# Patient Record
Sex: FEMALE | Race: WHITE | NOT HISPANIC OR LATINO | Employment: FULL TIME | URBAN - METROPOLITAN AREA
[De-identification: names, ages, dates, MRNs, and addresses within clinical notes are randomized per-mention and may not be internally consistent; named-entity substitution may affect disease eponyms.]

---

## 2022-02-22 ENCOUNTER — TELEPHONE (OUTPATIENT)
Dept: NEUROLOGY | Facility: CLINIC | Age: 22
End: 2022-02-22

## 2022-02-22 NOTE — TELEPHONE ENCOUNTER
Called and spoke w/the patient about an appointment time change  She advised she is ok w/the change  The date stayed the same   Appointment time is now at 8:15am     3/2/2022 Status: Edinson    Time: 8:15 AM Length: 60   Visit Type: CONSULT PG [[id number]]     Provider: Kathie Lindsey

## 2022-03-02 ENCOUNTER — CONSULT (OUTPATIENT)
Dept: NEUROLOGY | Facility: CLINIC | Age: 22
End: 2022-03-02
Payer: COMMERCIAL

## 2022-03-02 VITALS
TEMPERATURE: 97.5 F | SYSTOLIC BLOOD PRESSURE: 128 MMHG | BODY MASS INDEX: 31.02 KG/M2 | HEART RATE: 87 BPM | DIASTOLIC BLOOD PRESSURE: 69 MMHG | HEIGHT: 64 IN | WEIGHT: 181.7 LBS

## 2022-03-02 DIAGNOSIS — G43.829 MENSTRUAL MIGRAINE WITHOUT STATUS MIGRAINOSUS, NOT INTRACTABLE: ICD-10-CM

## 2022-03-02 DIAGNOSIS — G44.229 CHRONIC TENSION-TYPE HEADACHE, NOT INTRACTABLE: Primary | ICD-10-CM

## 2022-03-02 DIAGNOSIS — E55.9 VITAMIN D DEFICIENCY: ICD-10-CM

## 2022-03-02 PROCEDURE — 99204 OFFICE O/P NEW MOD 45 MIN: CPT

## 2022-03-02 RX ORDER — LEVONORGESTREL AND ETHINYL ESTRADIOL 0.1-0.02MG
KIT ORAL
COMMUNITY
Start: 2021-12-27 | End: 2022-07-25

## 2022-03-02 RX ORDER — DIPHENHYDRAMINE HCL 25 MG
25 TABLET ORAL EVERY 6 HOURS PRN
COMMUNITY

## 2022-03-02 RX ORDER — NARATRIPTAN 2.5 MG/1
TABLET ORAL
Qty: 9 TABLET | Refills: 2 | Status: SHIPPED | OUTPATIENT
Start: 2022-03-02 | End: 2022-07-25

## 2022-03-02 RX ORDER — AMITRIPTYLINE HYDROCHLORIDE 10 MG/1
TABLET, FILM COATED ORAL
Qty: 30 TABLET | Refills: 2 | Status: SHIPPED | OUTPATIENT
Start: 2022-03-02 | End: 2022-03-24

## 2022-03-02 NOTE — PATIENT INSTRUCTIONS
-  Complete lab work  -  Obtain MRI of the brain  - start amitriptyline 10 mg at bedtime take this every day,   Call me in 8 weeks to consider increasing if necessary  - take naratriptan as needed at the onset of a migraine  -  magnesium 500 mg daily, could consider taking twice daily however may cause loose stools  - riboflavin 400 mg daily (may cause bright yellow urine)  -  Please track your headaches and migraines using a free josé antonio on your phone  - Follow up in 3 months

## 2022-03-02 NOTE — ASSESSMENT & PLAN NOTE
Type 2 "Full blown" migraine  She states starts similar to type 1 headache but then builds gradually  Described as pressure behind the left eye, needle type pain  Can be on the right at times behind eyes as well  Pain can radiate into forehead at times  10/10 intensity  Associated with full body aches and light sensitivity  No auara  Lasts until she can go to sleep, maximum of 12 hours  Not positional, never wakes her up out of her sleep  Occurs 1-2 times a month usually with her menses  No other identified triggers    She has never been on preventative treatment for her migraines or chronic headaches as she was told they were not frequent enough  Her last neurologist prescribed ubrelvy as an abortive however this has not been effective  Her exam was normal today  We will initiate treatment for her chronic episodic tension type headaches with amitriptyline  We discussed that this may also be beneficial for her menstrual migraines  However, another option discussed was continuous cycling of her oral birth control, eliminating the placebo pills from her pack  I explained that this would have to be discussed with her ob/gyn and they could direct her how to safely do this and that this may prevent abrupt hormone fluctuations that can be associated with menstrual migraines  We also agreed that since her Inna Glaze is not effective, we will discontinue this and initiate an alternative  She has never tried a Triptan in the past, I will prescribe naratriptan 2 5 mg for her to take at the onset of a migraine  We discussed rebound headaches/migraines and educated her that she should not use more than 3 per week or 9 per month  We discussed that her exam is normal today and not indicative of a intracranial abnormality however since she has had an increase in the frequency of her headaches, we will proceed with a MRI of the brain to be thorough   I will also order labs that have not previously been ordered (CRP, sed, TSH) and Vitamin D as she has been chronically deficient and is not currently taking a supplement/replacement

## 2022-03-02 NOTE — PROGRESS NOTES
Patient ID: Opal Loyd is a 25 y o  female  Assessment/Plan:    Chronic tension-type headache, not intractable  Meri Suh is a 25year old female who presents to the office as a consultation for headaches  She states she began having headaches at age 21years old  She was previously under the care of a neurologist however, she moved and would like to establish care with a new practice closer to her home  Unfortunately, there are no records available to review today  We discussed that her daily headaches seem to be consistent with chronic tension type headaches  Due to the daily frequency at this time I do recommend treatment with a prophylactic medication  We discussed several options including topiramate, gabapentin and amitriptyline  After reviewing potential benefits and adverse effects, she would prefer to start amitriptyline  She admits to underlying anxiety, so this may also be helpful in this aspect as well  She will start with 10 mg at bedtime and will call me in 8 weeks to determine efficiency and if needed can increase to 20 mg  Other recommendations provided today included:  -  Complete lab work  -  Obtain MRI of the brain  - start amitriptyline 10 mg at bedtime take this every day,   Call me in 8 weeks to consider increasing if necessary  - take naratriptan as needed at the onset of a migraine  -  magnesium 500 mg daily, could consider taking twice daily however may cause loose stools  - riboflavin 400 mg daily (may cause bright yellow urine)  - Stay well hydrated  - Do not skip meals  -  Please track your headaches and migraines using a free josé antonio on your phone  - Follow up in 3 months    Menstrual migraine without status migrainosus, not intractable  Type 2 "Full blown" migraine  She states starts similar to type 1 headache but then builds gradually  Described as pressure behind the left eye, needle type pain  Can be on the right at times behind eyes as well    Pain can radiate into forehead at times  10/10 intensity  Associated with full body aches and light sensitivity  No auara  Lasts until she can go to sleep, maximum of 12 hours  Not positional, never wakes her up out of her sleep  Occurs 1-2 times a month usually with her menses  No other identified triggers    She has never been on preventative treatment for her migraines or chronic headaches as she was told they were not frequent enough  Her last neurologist prescribed ubrelvy as an abortive however this has not been effective  Her exam was normal today  We will initiate treatment for her chronic episodic tension type headaches with amitriptyline  We discussed that this may also be beneficial for her menstrual migraines  However, another option discussed was continuous cycling of her oral birth control, eliminating the placebo pills from her pack  I explained that this would have to be discussed with her ob/gyn and they could direct her how to safely do this and that this may prevent abrupt hormone fluctuations that can be associated with menstrual migraines  We also agreed that since her Lauren Labella is not effective, we will discontinue this and initiate an alternative  She has never tried a Triptan in the past, I will prescribe naratriptan 2 5 mg for her to take at the onset of a migraine  We discussed rebound headaches/migraines and educated her that she should not use more than 3 per week or 9 per month  We discussed that her exam is normal today and not indicative of a intracranial abnormality however since she has had an increase in the frequency of her headaches, we will proceed with a MRI of the brain to be thorough  I will also order labs that have not previously been ordered (CRP, sed, TSH) and Vitamin D as she has been chronically deficient and is not currently taking a supplement/replacement         Diagnoses and all orders for this visit:    Chronic tension-type headache, not intractable  -     TSH, 3rd generation with Free T4 reflex; Future  -     MRI brain without contrast; Future  -     amitriptyline (ELAVIL) 10 mg tablet; Take 1 tablet at bedtime  -     C-reactive protein; Future  -     Sedimentation rate, automated; Future    Menstrual migraine without status migrainosus, not intractable  -     naratriptan (AMERGE) 2 5 MG tablet; 1 tablet at the onset of migraine, may repeat once in 4 hours if needed  -     C-reactive protein; Future  -     Sedimentation rate, automated; Future    Vitamin D deficiency  -     Vitamin D 1,25 dihydroxy; Future        Subjective:    HPI Haven Sparks is a 25year old female who presents to the office as a consultation for headaches  She states she began having headaches at age 21years old  She was previously under the care of a neurologist however, she moved and would like to establish care with a new practice closer to her home  Unfortunately, there are no records available to review today  She works as a  and has a 1 hour commute there and back  She works 11 hour shifts, 4 days on and 4 days off and does not currently have a PCP  She describes two distinct types of headaches:      Type 1 Headache  described as "nagging"  Currently occuring everyday  She states it is "band like" and can be squeezing/tight  Average pain 3/10  gradual onset  Lasts 12 hours  No identified triggers    Type 2 "Full blown" migraine  She states starts similar to type 1 headache but then builds gradually  Described as pressure behind the left eye, needle type pain  Can be on the right at times behind eyes as well  Pain can radiate into forehead at times  10/10 intensity  Associated with full body aches and light sensitivity  No auara  Lasts until she can go to sleep, maximum of 12 hours  Not positional, never wakes her up out of her sleep    Occurs 1-2 times a month usually with her menses  No other identified triggers    She has never been on preventative treatment for her migraines or chronic headaches as she was told they were not frequent enough  Her last neurologist prescribed ubrelvy as an abortive however this has not been effective  Temporal Pattern of Headaches:  Started having HA's 21 y o  Worst time of day: during the day if in the sun  Awaken from sleep?: no  Seasonal pattern?: yes - worse in the summer  'Clustering' of HA's over time? no  Overall pattern since problem began: gradually worsening migraines are worse    Degree of Functional Impairment: moderate when she has a migraine cannot get out of bed, headaches do not impair her at all  Current Use of Meds to Treat HA:  Abortive meds? ubrelvy and advil  Daily use? no  Preventive meds? {none  Non-Medical/Alternative treatments for HA: peppermint oil    Additional Relevant History:  History of head/neck trauma? no  History of head/neck surgery? No- tonsils and adenoids  Family h/o headache problems? yes - mom, maternal grandmother with brain tumor  Family history of aneurysms? no  Exposure to carbon monoxide? no  Substance use: alcohol: socially, tobacco: vape daily, caffeine: coffee 2x week    Prior Evaluation/Treatments:  Have you seen another physician for your headaches? yes "Dr Ольга Garrido"  Prior work-up: blood work was tested for Lyme, RA, MOUNA,- all negative Vit D- defficient  Trigger point injections? no  Botox injections? no  Epidural injections? no  Prior PREVENTATIVE medications: none  Prior ABORTIVE mediations: acetaminophen, aspirin/acetaminophen/caffeine      The following portions of the patient's history were reviewed and updated as appropriate: allergies, current medications, past family history, past medical history, past social history and past surgical history  Objective:    Blood pressure 128/69, pulse 87, temperature 97 5 °F (36 4 °C), temperature source Skin, height 5' 4" (1 626 m), weight 82 4 kg (181 lb 11 2 oz)  Neurological Exam    On neurological examination patient is alert, awake, oriented and in no distress   Speech is fluent without dysarthria or aphasia  Cranial nerves 2-12 were symmetrically intact bilaterally  Visual acuity was 20/20 on hand held eye chart  No temporal artery tenderness  No evidence of any focal weakness or sensory loss in the upper or lower extremities  Motor testing reveals 5/5 strength of the bilateral upper and lower extremities  There was no pronator drift  No fasciculations present  No abnormal involuntary movements  Finger- to-nose reveals no tremor or ataxia and intact proprioceptive function, no dysmetria was noted  Sensation was intact to vibration, light touch, pin prick and temperature in bilateral upper and lower extremities  Deep tendon reflexes were 2+ and symmetric in the bilateral upper and lower extremities  She is able to rise easily without assistance from a seated position  Casual gait is normal including stance, stride, and arm swing  Normal tandem gait  Romberg is absent  There is no tenderness on palpation of the cervical, thoracic or lumbar spinous process, with no radicular pain noted  ROS:    Review of Systems   Constitutional: Negative  Negative for appetite change and fever  HENT: Positive for tinnitus  Negative for hearing loss, trouble swallowing and voice change  Eyes: Positive for photophobia  Negative for pain  Respiratory: Negative  Negative for shortness of breath  Cardiovascular: Negative  Negative for palpitations  Gastrointestinal: Negative  Negative for nausea and vomiting  Endocrine: Negative  Negative for cold intolerance  Genitourinary: Negative  Negative for dysuria, frequency and urgency  Musculoskeletal: Negative  Negative for myalgias and neck pain  Skin: Negative  Negative for rash  Neurological: Positive for headaches (2-3 week headaches and 1 migraine per month around menses time)  Negative for dizziness, tremors, seizures, syncope, facial asymmetry, speech difficulty, weakness, light-headedness and numbness  Hematological: Negative  Does not bruise/bleed easily  Psychiatric/Behavioral: Negative  Negative for confusion, hallucinations and sleep disturbance  Reviewed ROS as entered by medical assistant

## 2022-03-02 NOTE — ASSESSMENT & PLAN NOTE
Dick Mills is a 25year old female who presents to the office as a consultation for headaches  She states she began having headaches at age 21years old  She was previously under the care of a neurologist however, she moved and would like to establish care with a new practice closer to her home  Unfortunately, there are no records available to review today  We discussed that her daily headaches seem to be consistent with chronic tension type headaches  Due to the daily frequency at this time I do recommend treatment with a prophylactic medication  We discussed several options including topiramate, gabapentin and amitriptyline  After reviewing potential benefits and adverse effects, she would prefer to start amitriptyline  She admits to underlying anxiety, so this may also be helpful in this aspect as well  She will start with 10 mg at bedtime and will call me in 8 weeks to determine efficiency and if needed can increase to 20 mg      Other recommendations provided today included:  -  Complete lab work  -  Obtain MRI of the brain  - start amitriptyline 10 mg at bedtime take this every day,   Call me in 8 weeks to consider increasing if necessary  - take naratriptan as needed at the onset of a migraine  -  magnesium 500 mg daily, could consider taking twice daily however may cause loose stools  - riboflavin 400 mg daily (may cause bright yellow urine)  - Stay well hydrated  - Do not skip meals  -  Please track your headaches and migraines using a free josé antonio on your phone  - Follow up in 3 months

## 2022-03-04 LAB
1,25(OH)2D3 SERPL-MCNC: 47.4 PG/ML (ref 19.9–79.3)
25(OH)D3+25(OH)D2 SERPL-MCNC: 24.5 NG/ML (ref 30–100)
CRP SERPL-MCNC: 7 MG/L (ref 0–10)
ERYTHROCYTE [SEDIMENTATION RATE] IN BLOOD BY WESTERGREN METHOD: 16 MM/HR (ref 0–32)
TSH SERPL DL<=0.005 MIU/L-ACNC: 2.41 UIU/ML (ref 0.45–4.5)

## 2022-03-07 DIAGNOSIS — E55.9 MILD VITAMIN D DEFICIENCY: Primary | ICD-10-CM

## 2022-03-07 RX ORDER — ERGOCALCIFEROL 1.25 MG/1
50000 CAPSULE ORAL WEEKLY
Qty: 6 CAPSULE | Refills: 0 | Status: SHIPPED | OUTPATIENT
Start: 2022-03-07 | End: 2022-04-25 | Stop reason: ALTCHOICE

## 2022-04-24 DIAGNOSIS — E55.9 MILD VITAMIN D DEFICIENCY: ICD-10-CM

## 2022-04-25 RX ORDER — ERGOCALCIFEROL 1.25 MG/1
50000 CAPSULE ORAL WEEKLY
Qty: 6 CAPSULE | Refills: 0 | OUTPATIENT
Start: 2022-04-25

## 2022-06-10 ENCOUNTER — TELEPHONE (OUTPATIENT)
Dept: NEUROLOGY | Facility: CLINIC | Age: 22
End: 2022-06-10

## 2022-06-10 NOTE — TELEPHONE ENCOUNTER
Called patient and lvm to call back to confirm [facility]la [facility] and also to see if she has done the Mri that it was order on her last office with us

## 2022-06-24 DIAGNOSIS — G44.229 CHRONIC TENSION-TYPE HEADACHE, NOT INTRACTABLE: ICD-10-CM

## 2022-06-24 RX ORDER — AMITRIPTYLINE HYDROCHLORIDE 10 MG/1
TABLET, FILM COATED ORAL
Qty: 90 TABLET | Refills: 0 | Status: SHIPPED | OUTPATIENT
Start: 2022-06-24 | End: 2022-07-25

## 2022-06-27 ENCOUNTER — TELEPHONE (OUTPATIENT)
Dept: NEUROLOGY | Facility: CLINIC | Age: 22
End: 2022-06-27

## 2022-06-27 ENCOUNTER — TELEPHONE (OUTPATIENT)
Dept: FAMILY MEDICINE CLINIC | Facility: CLINIC | Age: 22
End: 2022-06-27

## 2022-06-27 NOTE — TELEPHONE ENCOUNTER
Pt calling and states that Dr Leola Toledo told her to call if pregnant  Pregnancy test came back positive recently  Pt states she is in early stage and has not had visit with OB yet  Pt asking if there are any medications she should stop taking  Pt currently taking amitriptyline 10 mg takes 1 tab at HS, also taking naratriptan 2 5 mg 1 tablet PRN  Pt states she takes OTC riboflavin, magnesium and D3  Pt also asking if it's ok that she is taking 2 aleve every night for back spasms  Dr Leola Toledo - Please advise  Best [phone], ok to leave detailed message

## 2022-06-27 NOTE — TELEPHONE ENCOUNTER
LMOM patient scheduled an appointment through [facility] for 6/28  Patient is not an established pt with [facility]  Patient needs to reschedule appt on 6/28 for establish care      Jimmy Fermin

## 2022-06-28 NOTE — TELEPHONE ENCOUNTER
Hi, I saw Tracie Aparicio on 3/2/22  Please let her know she should discontinue Amitriptyline if she is currently pregnant  She is only taking 10 mg at bedtime, so no need to taper- she can just stop  Naratriptan is considered "low risk in pregnancy" however I would prefer she use tylenol (which is safer) as needed for her headaches and her back spasms   She can continue with riboflavin, magnesium and Vitamin D

## 2022-06-28 NOTE — TELEPHONE ENCOUNTER
Pt made aware of Rand's recommendations  Verbalizes clear understanding  Pt asking about the Tylenol  Pt informs that in the past her liver enzymes were increased  She tested negative for hepatitis and was told it was from taking Tylenol  Pt asking if it is ok that she takes Tylenol even with this in her history  Pt says she was taking Tylenol for back spasms  Rand - Please advise

## 2022-07-25 ENCOUNTER — OFFICE VISIT (OUTPATIENT)
Dept: OBGYN CLINIC | Facility: CLINIC | Age: 22
End: 2022-07-25
Payer: COMMERCIAL

## 2022-07-25 VITALS
WEIGHT: 192.8 LBS | BODY MASS INDEX: 32.91 KG/M2 | HEIGHT: 64 IN | DIASTOLIC BLOOD PRESSURE: 80 MMHG | SYSTOLIC BLOOD PRESSURE: 124 MMHG

## 2022-07-25 DIAGNOSIS — N91.2 AMENORRHEA: Primary | ICD-10-CM

## 2022-07-25 LAB — SL AMB POCT URINE HCG: POSITIVE

## 2022-07-25 PROCEDURE — 99204 OFFICE O/P NEW MOD 45 MIN: CPT | Performed by: PHYSICIAN ASSISTANT

## 2022-07-25 PROCEDURE — 81025 URINE PREGNANCY TEST: CPT | Performed by: PHYSICIAN ASSISTANT

## 2022-07-25 PROCEDURE — 76801 OB US < 14 WKS SINGLE FETUS: CPT | Performed by: PHYSICIAN ASSISTANT

## 2022-07-25 RX ORDER — CHOLECALCIFEROL (VITAMIN D3) 1250 MCG
CAPSULE ORAL
COMMUNITY

## 2022-07-25 NOTE — PROGRESS NOTES
Assessment/Plan:  - Viable IUP @ 7w6d today   - BRUNO 2023 (adjusted based on US)  - Continue PNV  - Call for concerns  - RTO 2 weeks for OB intake     Diagnoses and all orders for this visit:    Amenorrhea  -     POCT urine HCG  -     AMB US OB < 14 weeks single or first gestation level 1  -     Ambulatory Referral to Maternal Fetal Medicine; Future    Other orders  -     Cholecalciferol (Vitamin D3) 1 25 MG (64430 UT) CAPS  -     Magnesium 400 MG CAPS  -     Prenatal Multivit-Min-Fe-FA (Pre-Pretty Formula) TABS  -     Acetaminophen (TYLENOL PO); Take by mouth          Subjective:      Patient ID: Jeremy Link is a 25 y o  female  Angel Luis Yang is a 21YO  WF presenting to the office for pregnancy confirmation US  She reports her LMP as 22, placing her at [address] today  She has had some nausea, but only one vomiting episode  She is feeling nervous today  She reports her mother had difficulties staying pregnant  The following portions of the patient's history were reviewed and updated as appropriate: allergies, current medications, past family history, past medical history, past social history, past surgical history and problem list     Review of Systems      Objective:      /80 (BP Location: Right arm, Patient Position: Sitting, Cuff Size: Standard)   Ht 5' 4" (1 626 m)   Wt 87 5 kg (192 lb 12 8 oz)   LMP 2022   BMI 33 09 kg/m²          Physical Exam  Vitals reviewed  Constitutional:       Appearance: Normal appearance  She is normal weight  HENT:      Head: Normocephalic and atraumatic  Pulmonary:      Effort: Pulmonary effort is normal    Genitourinary:     General: Normal vulva  Labia:         Right: No rash or lesion  Left: No rash or lesion  Comments: TVUS reveals IUP, yolk sac, fetal pole, +CM  CRL 1 51 cm (7w6d)   bpm  BRUNO 2023  Skin:     General: Skin is warm and dry  Neurological:      General: No focal deficit present        Mental Status: She is alert  Psychiatric:         Mood and Affect: Mood normal          Behavior: Behavior normal            Ultrasound Probe Disinfection    A transvaginal ultrasound was performed     Prior to use, disinfection was performed with High Level Disinfection Process (Trophon)  Probe serial number [id number]: [id number] was used    Flynn Klinefelter, PA-C  07/25/22  3:31 PM

## 2022-08-16 ENCOUNTER — INITIAL PRENATAL (OUTPATIENT)
Dept: OBGYN CLINIC | Facility: CLINIC | Age: 22
End: 2022-08-16

## 2022-08-16 VITALS — BODY MASS INDEX: 33.4 KG/M2 | WEIGHT: 194.6 LBS

## 2022-08-16 DIAGNOSIS — O21.9 NAUSEA AND VOMITING IN PREGNANCY: ICD-10-CM

## 2022-08-16 DIAGNOSIS — O99.891 BACK PAIN AFFECTING PREGNANCY IN FIRST TRIMESTER: ICD-10-CM

## 2022-08-16 DIAGNOSIS — O99.211 OBESITY AFFECTING PREGNANCY IN FIRST TRIMESTER: Primary | ICD-10-CM

## 2022-08-16 DIAGNOSIS — M54.9 BACK PAIN AFFECTING PREGNANCY IN FIRST TRIMESTER: ICD-10-CM

## 2022-08-16 PROCEDURE — OBC

## 2022-08-16 RX ORDER — ONDANSETRON 8 MG/1
8 TABLET, ORALLY DISINTEGRATING ORAL EVERY 8 HOURS PRN
Qty: 20 TABLET | Refills: 0 | Status: SHIPPED | OUTPATIENT
Start: 2022-08-16 | End: 2022-10-13 | Stop reason: SDUPTHER

## 2022-08-16 NOTE — PROGRESS NOTES
OB INTAKE INTERVIEW  Pt presents for OB intake  The patient was oriented to our practice and all questions were answered  PT referral place d/t back pain  Pt  Requests script for zofran  Plan:  - Prenatal labs ordered  - Referral given for MFM at confirmation scan  - Reviewed Genetic testing options   CF:   SMA:   - Patient to call for concerns  - RTO 3-4 weeks for OB F/U visit and PAP/Cultures    ~Last pap: pt  Has never had pap  OB History        1    Para        Term                AB        Living           SAB        IAB        Ectopic        Multiple        Live Births                         Last Menstrual Period:      22             Ultrasound date: 22  Estimated Date of Delivery:  3/7/23     Current Issues:  Constipation :     yes, discussed increased hydration use of stool softener and probiotic  Pt  Does take fiber supplement  Headaches :               Yes--pt  Has hx of migraines  Discussed use of tylenol PRN and use of naratriptan for rescue sparingly  Pt  Taking magnesium and riboflavin daily for prevention  Cramping:              declines  Spotting :                declines     Interview education  · [facility] P[facility] Essentials reviewed and discussed   · Baby and [address] Handout  · [facility] MFM Handouts  · Discussed genetic testing  · Prenatal lab work: Scripts printed and given to pt         Prior Pregnancy Delivery Complications---n/a              History of  delivery or PPROM:   History of Shoulder Dystocia:               History of vacuum or forceps delivery:               History of 3rd/4th degree laceration:               History of  section:      Diabetes              Pregestational DM: no              hx of GDM: no              BMI >35: no              first degree relative with type 2 diabetes: no              hx of PCOS: no              current metformin use: no              prior hx of LGA/macrosomia: no Hypertension              Hx of chronic HTN: no              hx of gestational HTN: no               hx of preeclampsia, eclampsia, or HELLP syndrome: no              Age 28 or older: no              Multifetal gestation: no  Type 1 or Type 2 DM: no  Renal Disease: no  Autoimmune disease: no   Nulliparity: YES  Obesity (BMI over 30): YES  More than 10 year pregnancy interval: no  Previous IUGR, low birthweight or small for gestational age: no     Immunizations:                influenza vaccine: Pt  agreeable              Covid Vaccination: Up to date with 1 booster  Discussed Tdap vaccine administration at 27-28 weeks                   There is no immunization history on file for this patient           Dental visit with last 6 months: unable to assess  PHQ-2/9 score: unable to assess  MyChart activated (not [age] years of age)?: Zack Rosa

## 2022-08-18 ENCOUNTER — APPOINTMENT (OUTPATIENT)
Dept: LAB | Facility: HOSPITAL | Age: 22
End: 2022-08-18
Payer: COMMERCIAL

## 2022-08-18 DIAGNOSIS — O99.211 OBESITY AFFECTING PREGNANCY IN FIRST TRIMESTER: ICD-10-CM

## 2022-08-18 DIAGNOSIS — G44.229 CHRONIC TENSION-TYPE HEADACHE, NOT INTRACTABLE: ICD-10-CM

## 2022-08-18 DIAGNOSIS — G43.829 MENSTRUAL MIGRAINE WITHOUT STATUS MIGRAINOSUS, NOT INTRACTABLE: ICD-10-CM

## 2022-08-18 DIAGNOSIS — E55.9 VITAMIN D DEFICIENCY: ICD-10-CM

## 2022-08-18 LAB
ABO GROUP BLD: NORMAL
BASOPHILS # BLD AUTO: 0.03 THOUSANDS/ΜL (ref 0–0.1)
BASOPHILS NFR BLD AUTO: 0 % (ref 0–1)
BLD GP AB SCN SERPL QL: NEGATIVE
EOSINOPHIL # BLD AUTO: 0.32 THOUSAND/ΜL (ref 0–0.61)
EOSINOPHIL NFR BLD AUTO: 3 % (ref 0–6)
ERYTHROCYTE [DISTWIDTH] IN BLOOD BY AUTOMATED COUNT: 12.8 % (ref 11.6–15.1)
HBV SURFACE AG SER QL: NORMAL
HCT VFR BLD AUTO: 38.9 % (ref 34.8–46.1)
HCV AB SER QL: NORMAL
HGB BLD-MCNC: 13.2 G/DL (ref 11.5–15.4)
IMM GRANULOCYTES # BLD AUTO: 0.04 THOUSAND/UL (ref 0–0.2)
IMM GRANULOCYTES NFR BLD AUTO: 0 % (ref 0–2)
LYMPHOCYTES # BLD AUTO: 2.23 THOUSANDS/ΜL (ref 0.6–4.47)
LYMPHOCYTES NFR BLD AUTO: 23 % (ref 14–44)
MCH RBC QN AUTO: 28.5 PG (ref 26.8–34.3)
MCHC RBC AUTO-ENTMCNC: 33.9 G/DL (ref 31.4–37.4)
MCV RBC AUTO: 84 FL (ref 82–98)
MONOCYTES # BLD AUTO: 0.69 THOUSAND/ΜL (ref 0.17–1.22)
MONOCYTES NFR BLD AUTO: 7 % (ref 4–12)
NEUTROPHILS # BLD AUTO: 6.51 THOUSANDS/ΜL (ref 1.85–7.62)
NEUTS SEG NFR BLD AUTO: 67 % (ref 43–75)
NRBC BLD AUTO-RTO: 0 /100 WBCS
PLATELET # BLD AUTO: 309 THOUSANDS/UL (ref 149–390)
PMV BLD AUTO: 8.9 FL (ref 8.9–12.7)
RBC # BLD AUTO: 4.63 MILLION/UL (ref 3.81–5.12)
RH BLD: POSITIVE
RUBV IGG SERPL IA-ACNC: 46 IU/ML
SPECIMEN EXPIRATION DATE: NORMAL
WBC # BLD AUTO: 9.82 THOUSAND/UL (ref 4.31–10.16)

## 2022-08-18 PROCEDURE — 86787 VARICELLA-ZOSTER ANTIBODY: CPT

## 2022-08-18 PROCEDURE — 36415 COLL VENOUS BLD VENIPUNCTURE: CPT

## 2022-08-18 PROCEDURE — 80081 OBSTETRIC PANEL INC HIV TSTG: CPT

## 2022-08-18 PROCEDURE — 86803 HEPATITIS C AB TEST: CPT

## 2022-08-18 PROCEDURE — 87086 URINE CULTURE/COLONY COUNT: CPT

## 2022-08-19 LAB
HIV 1+2 AB+HIV1 P24 AG SERPL QL IA: NORMAL
RPR SER QL: NORMAL
VZV IGG SER QL IA: ABNORMAL

## 2022-08-20 LAB
BACTERIA UR CULT: ABNORMAL
BACTERIA UR CULT: ABNORMAL

## 2022-08-23 ENCOUNTER — TELEPHONE (OUTPATIENT)
Dept: OBGYN CLINIC | Facility: CLINIC | Age: 22
End: 2022-08-23

## 2022-08-23 ENCOUNTER — EVALUATION (OUTPATIENT)
Dept: PHYSICAL THERAPY | Facility: CLINIC | Age: 22
End: 2022-08-23
Payer: COMMERCIAL

## 2022-08-23 DIAGNOSIS — M54.9 BACK PAIN AFFECTING PREGNANCY IN FIRST TRIMESTER: Primary | ICD-10-CM

## 2022-08-23 DIAGNOSIS — O99.891 BACK PAIN AFFECTING PREGNANCY IN FIRST TRIMESTER: Primary | ICD-10-CM

## 2022-08-23 PROCEDURE — 97112 NEUROMUSCULAR REEDUCATION: CPT

## 2022-08-23 NOTE — TELEPHONE ENCOUNTER
Patient called  She is 12 weeks pregnant and would like to know if she can get a prenatal massage at [facility]  Notified patient that this is ok

## 2022-08-23 NOTE — PROGRESS NOTES
PT Evaluation     Today's date: 2022  Patient name: Manuela Fraser  : 2000  MRN: [id number]  Referring provider: Tamica Drake MD  Dx:   Encounter Diagnosis     ICD-10-CM    1  Back pain affecting pregnancy in first trimester  O99 891 Ambulatory Referral to Physical Therapy    M54 9                   Assessment  Assessment details: Manuela Fraser is a 25 y o  female who presents with pain, decreased strength, decreased ROM, decreased joint mobility and postural dysfunction  Due to these impairments, patient has difficulty performing ADL's, recreational activities, lifting/carrying, transfers, reaching  Patient's clinical presentation is consistent with their referring diagnosis of Back pain affecting pregnancy in first trimester  (primary encounter diagnosis)  Plan: Ambulatory Referral to Physical Therapy    Patient has been educated in home exercise program and plan of care   Patient would benefit from skilled physical therapy services to address their aforementioned functional limitations and progress towards prior level of function and independence with home exercise program      Impairments: abnormal gait, abnormal or restricted ROM, activity intolerance, impaired physical strength, lacks appropriate home exercise program, pain with function, poor posture  and poor body mechanics    Goals  Short term goals to be accomplished in 3 weeks:  STG 1: Pt will demo independence with postural management  STG 2: Pt will demo I with HEP to maximize progress between therapy sessions  STG 3: Pt will demo L/S AROM < or = min loss throughout to promote improved functional mobility and body mechanics  STG 4: Pt will demo 1/2 MMT grade core stabilizers to improve lumbar stability with functional challenges  STG 5: Pt will reports pain dec freq and intensity 50%  STG 6: Pt will deny sleep disturbance    Long term goals to be accomplished in 6 weeks:  LTG 1: Pt will demo good body mech with >75% functional challenges to prevent reinjury  LTG 2: Pt will be able to return to walking activity for 30 minutes/hours pain free as per PLOF  LTG 3: Pt will demo Good strength core stabilizers to promote carryover with body mech and posture    Plan  Plan details:  HEP development, stretching, strengthening, A/AA/PROM, joint mobilizations, posture education, STM/MI as needed to reduce muscle tension, muscle reeducation, patient has been educated in Dx, prognosis and plan of care and is in agreement    Patient would benefit from: PT eval and skilled physical therapy  Planned modality interventions: cryotherapy and thermotherapy: hydrocollator packs  Planned therapy interventions: manual therapy, neuromuscular re-education, self care, therapeutic activities, therapeutic exercise and home exercise program  Frequency: 2x week  Duration in weeks: 6  Treatment plan discussed with: patient        Subjective Evaluation    History of Present Illness  Mechanism of injury: Pt reports that she has had pain in her low back for quite some time  States that due to her recent pregnancy she has had an increase in symptoms  She has pain when walking for long periods of time,sitting for a while, going from a sit to stand, and doing vigorous activity  States that at times she has numbness and tingling down her right leg  She reports that she does have a very sedentary lifestyle due to lack of time and busy schedule     Pain  Current pain ratin  At best pain ratin  At worst pain ratin  Quality: sharp, knife-like, pulling, tight and discomfort  Relieving factors: rest, change in position, heat and ice  Aggravating factors: walking, stair climbing, sitting, running and lifting  Progression: no change    Social Support  Steps to enter house: yes  Stairs in house: yes   Lives with: spouse and significant other    Employment status: working  Treatments  Previous treatment: physical therapy  Current treatment: physical therapy  Patient Goals  Patient goals for therapy: increased strength, independence with ADLs/IADLs, return to work, return to sport/leisure activities and decreased pain          Objective     Postural Observations  Seated posture: poor  Standing posture: fair        Active Range of Motion     Lumbar   Normal active range of motion    Strength/Myotome Testing     Left Hip   Planes of Motion   Flexion: 3-  Extension: 3-  Abduction: 3-  External rotation: 4    Right Hip   Planes of Motion   Flexion: 3-  Extension: 3-  Abduction: 3-  External rotation: 4    Left Knee   Flexion: 4  Extension: 4    Right Knee   Flexion: 4  Extension: 4    Muscle Activation   Patient able to activate left transverse abdominals, left multifidus, right transverse abdominals and right multifidus                Precautions: standard       Manuals                                                                 Neuro Re-Ed                                                                                                        Ther Ex                                                                                                                     Ther Activity                                       Gait Training                                       Modalities

## 2022-08-24 PROCEDURE — 97162 PT EVAL MOD COMPLEX 30 MIN: CPT

## 2022-08-31 ENCOUNTER — ROUTINE PRENATAL (OUTPATIENT)
Dept: PERINATAL CARE | Facility: OTHER | Age: 22
End: 2022-08-31
Payer: COMMERCIAL

## 2022-08-31 VITALS
SYSTOLIC BLOOD PRESSURE: 114 MMHG | HEIGHT: 64 IN | HEART RATE: 82 BPM | BODY MASS INDEX: 32.86 KG/M2 | WEIGHT: 192.46 LBS | DIASTOLIC BLOOD PRESSURE: 80 MMHG

## 2022-08-31 DIAGNOSIS — Z36.82 NUCHAL TRANSLUCENCY OF FETUS ON PRENATAL ULTRASOUND: ICD-10-CM

## 2022-08-31 DIAGNOSIS — E66.9 OBESITY (BMI 30.0-34.9): Primary | ICD-10-CM

## 2022-08-31 DIAGNOSIS — Z3A.13 13 WEEKS GESTATION OF PREGNANCY: ICD-10-CM

## 2022-08-31 DIAGNOSIS — Z13.79 GENETIC SCREENING: ICD-10-CM

## 2022-08-31 PROCEDURE — 99203 OFFICE O/P NEW LOW 30 MIN: CPT | Performed by: OBSTETRICS & GYNECOLOGY

## 2022-08-31 PROCEDURE — 76813 OB US NUCHAL MEAS 1 GEST: CPT | Performed by: OBSTETRICS & GYNECOLOGY

## 2022-08-31 NOTE — PATIENT INSTRUCTIONS
Patient chose to have Stepwise Part 1 Screening from [facility]  Instructed patient blood work must be collected no later than 09/05/2022  Reviewed [facility] online appointment scheduling availability  Part 1 results will be available in approximately 7-10 business days  Maternal-Fetal Medicine will call patient with results or she can view in her [address]  Optimal collection for Part 2 is between 16-18 weeks but can be collected up to 22 weeks gestation  Lab slip and instruction letter will be mailed from our office  Patient instructed to take the paper lab slip to lab, this specialty genetic test is not yet in Epic  Patient verbalized understanding of all instructions

## 2022-08-31 NOTE — LETTER
September 5, 2022     [address]  [address] [facility]    Patient: Alina Curtis   YOB: 2000   Date of Visit: 8/31/2022       Dear Ms  formerly [facility]: Thank you for referring Rhoda De Los Santos to me for evaluation  Below are my notes for this consultation  If you have questions, please do not hesitate to call me  I look forward to following your patient along with you  Sincerely,        Tammie Cristobal MD        CC: No Recipients  Tammie Cristobal MD  9/5/2022 11:58 AM  Sign when Signing Visit  A fetal ultrasound was completed  See Ob procedures in Epic for an interpretation and recommendations  Do not hesitate to contact us in [facility] if you have questions  Mildred Aviles MD, [address] [place] [address]  Maternal Fetal Medicine

## 2022-08-31 NOTE — PROGRESS NOTES
Patient chose to have Stepwise Part 1 Screening from [facility]  Instructed patient blood work must be collected no later than 09/05/2022  Reviewed [facility] online appointment scheduling availability  Part 1 results will be available in approximately 7-10 business days  Maternal-Fetal Medicine will call patient with results or she can view in her [address]  Optimal collection for Part 2 is between 16-18 weeks but can be collected up to 22 weeks gestation  Lab slip and instruction letter will be mailed from our office  Patient instructed to take the paper lab slip to lab, this specialty genetic test is not yet in Epic  Patient verbalized understanding of all instructions

## 2022-09-05 PROBLEM — E66.9 OBESITY (BMI 30.0-34.9): Status: ACTIVE | Noted: 2022-09-05

## 2022-09-05 NOTE — PROGRESS NOTES
A fetal ultrasound was completed  See Ob procedures in Epic for an interpretation and recommendations  Do not hesitate to contact us in [facility] if you have questions  Sanda Apgar Jerolyn Le MD, [address] [facility]  Maternal Fetal Medicine

## 2022-09-08 ENCOUNTER — APPOINTMENT (OUTPATIENT)
Dept: PHYSICAL THERAPY | Facility: CLINIC | Age: 22
End: 2022-09-08
Payer: COMMERCIAL

## 2022-09-09 ENCOUNTER — TELEPHONE (OUTPATIENT)
Dept: PERINATAL CARE | Facility: OTHER | Age: 22
End: 2022-09-09

## 2022-09-09 LAB
# FETUSES US: 1
AGE: NORMAL
B-HCG ADJ MOM SERPL: 1.3
B-HCG SERPL-ACNC: 89.4 IU/ML
COLLECT DATE: NORMAL
CURRENT SMOKER: NORMAL
DONATED EGG PATIENT QL: NO
FET CRL US.MEAS: 67 MM
FET CRL US.MEAS: NORMAL MM
FET NUCHAL FOLD MOM THICKNESS US.MEAS: 1.21
FET NUCHAL FOLD THICKNESS US.MEAS: 1.9 MM
FET NUCHAL FOLD THICKNESS US.MEAS: NORMAL MM
FET TS 21 RISK FROM MAT AGE: NORMAL
GA CLIN EST: 13.1 WK
GA METHOD: NORMAL
HX OF NTD NARR: NO
HX OF TRISOMY 21 NARR: NO
IDDM PATIENT QL: NO
INTEGRATED SCN PATIENT-IMP: NORMAL
IVF PREGNANCY: NORMAL
PAPP-A MOM SERPL: 0.57
PAPP-A SERPL-MCNC: 515.5 NG/ML
PHYSICIAN NPI: NORMAL
SL AMB NASAL BONE: PRESENT
SL AMB NTQR LOCATION ID#: NORMAL
SL AMB NTQR READING PHYS ID#: NORMAL
SL AMB REFERRING PHYSICIAN NAME: NORMAL
SL AMB REFERRING PHYSICIAN PHONE: NORMAL
SL AMB REPEAT SPECIMEN: NO
SL AMB TWIN B NASAL BONE: NORMAL
SONOGRAPHER NAME: NORMAL
SONOGRAPHER: NORMAL
SONOGRAPHER: NORMAL
TS 18 RISK FETUS: NORMAL
TS 21 RISK FETUS: NORMAL
US DATE: NORMAL
US FETUSES STUDY [IMP]: NORMAL

## 2022-09-09 NOTE — TELEPHONE ENCOUNTER
I left a message for Carolann Miranda to notify her of the result of her sequential screen part 1  I also notified her of the optimal dating for part 2 as well as where to go for the blood draw  Lab slip and instructions mailed to Carolann Miranda

## 2022-09-09 NOTE — TELEPHONE ENCOUNTER
----- Message from Flower Anderson MD sent at 9/9/2022  1:32 PM EDT -----  I have reviewed the patient's lab results which are normal   Please contact the patient to inform her of the normal results, unless Ms Lovely Malik has already reviewed the results using Shane Brown MD has

## 2022-09-09 NOTE — RESULT ENCOUNTER NOTE
I have reviewed the patient's lab results which are normal   Please contact the patient to inform her of the normal results, unless Ms Matthew Pace has already reviewed the results using Jody Kat MD has

## 2022-09-09 NOTE — LETTER
09/09/22  Rhea Steel  2000    Thank you for completing Part 1 of your [facility] Stepwise Sequential Screen  To obtain a complete test result, please complete blood work for Part 2 Sequential Screen between the weeks of 9/15/22 to 11/9/22  Based on your insurance coverage, please use one of the following [facility] lab locations  The other option is to go to [email or web address]  Call our office for any questions at [phone]          Sincerely,    Agueda Barber RN

## 2022-09-15 ENCOUNTER — OFFICE VISIT (OUTPATIENT)
Dept: PHYSICAL THERAPY | Facility: CLINIC | Age: 22
End: 2022-09-15
Payer: COMMERCIAL

## 2022-09-15 ENCOUNTER — ROUTINE PRENATAL (OUTPATIENT)
Dept: OBGYN CLINIC | Facility: CLINIC | Age: 22
End: 2022-09-15

## 2022-09-15 VITALS — DIASTOLIC BLOOD PRESSURE: 60 MMHG | SYSTOLIC BLOOD PRESSURE: 110 MMHG | BODY MASS INDEX: 32.96 KG/M2 | WEIGHT: 192 LBS

## 2022-09-15 DIAGNOSIS — M54.9 BACK PAIN AFFECTING PREGNANCY IN FIRST TRIMESTER: Primary | ICD-10-CM

## 2022-09-15 DIAGNOSIS — Z3A.15 15 WEEKS GESTATION OF PREGNANCY: ICD-10-CM

## 2022-09-15 DIAGNOSIS — O99.891 BACK PAIN AFFECTING PREGNANCY IN FIRST TRIMESTER: Primary | ICD-10-CM

## 2022-09-15 DIAGNOSIS — O99.212 OBESITY AFFECTING PREGNANCY IN SECOND TRIMESTER: Primary | ICD-10-CM

## 2022-09-15 PROCEDURE — 97110 THERAPEUTIC EXERCISES: CPT

## 2022-09-15 PROCEDURE — 87491 CHLMYD TRACH DNA AMP PROBE: CPT | Performed by: PHYSICIAN ASSISTANT

## 2022-09-15 PROCEDURE — G0145 SCR C/V CYTO,THINLAYER,RESCR: HCPCS | Performed by: PHYSICIAN ASSISTANT

## 2022-09-15 PROCEDURE — 97112 NEUROMUSCULAR REEDUCATION: CPT

## 2022-09-15 PROCEDURE — 87591 N.GONORRHOEAE DNA AMP PROB: CPT | Performed by: PHYSICIAN ASSISTANT

## 2022-09-15 PROCEDURE — PNV: Performed by: PHYSICIAN ASSISTANT

## 2022-09-15 NOTE — PROGRESS NOTES
Daily Note     Today's date: 9/15/2022  Patient name: Isabel Valente  : 2000  MRN: [id number]  Referring provider: Juancho Giron MD  Dx:   Encounter Diagnosis     ICD-10-CM    1  Back pain affecting pregnancy in first trimester  O99 891     M54 9                   Subjective: Pt reports that she is having pain in her low back  She is currently 15 weeks pregnant  Pain level is 3/10  Objective: See treatment diary below      Assessment: Tolerated treatment well  Patient demonstrated fatigue post treatment, exhibited good technique with therapeutic exercises and would benefit from continued PT      Plan: Continue per plan of care  Precautions: standard       Manuals 09/15/22                                                                Neuro Re-Ed             TA iso  requires cues x 15            Bridges  X 15 w/ TA iso             Clamshells  X 20 ea  Ther Ex             Jacklyn stretch  X 15 ea               Happy baby  X 10 5s hold             Child pose  X 10 5s             Cat camel  X 10 5s hold             Modified birddog  X 10 B 3s hold                                                    Ther Activity                                       Gait Training                                       Modalities

## 2022-09-15 NOTE — PROGRESS NOTES
Pap and gc/chlam ordered  Pt is flying tomorrow-wants to discuss  wants to know if she should start baby aspirin
Patient is a 26 YO  female presenting to the office at 15 2 weeks for routine OB care     BP: 110/60  TWG: -2lb  Fetal Movement: none felt yet  Feeling well today  Denies LOF, ctx, vb  Normal NT  Had sequential screen  20 week anatomy scan scheduled  OK to transfuse and code  PAP and cultures collected  Call for concerns  RTO 4 weeks
Urine dip neg/neg
125

## 2022-09-19 LAB
C TRACH DNA SPEC QL NAA+PROBE: NEGATIVE
N GONORRHOEA DNA SPEC QL NAA+PROBE: NEGATIVE

## 2022-09-24 LAB
# FETUSES US: 1
AFP ADJ MOM SERPL: 0.83
AFP SERPL-MCNC: 23.2 NG/ML
B-HCG ADJ MOM SERPL: 0.89
B-HCG SERPL-ACNC: 31.7 IU/ML
COLLECT DATE: NORMAL
CURRENT SMOKER: NORMAL
FET CRL US.MEAS: 67 MM
FET NUCHAL FOLD MOM THICKNESS US.MEAS: 1.21
FET NUCHAL FOLD THICKNESS US.MEAS: 1.9 MM
FET TS 21 RISK FROM MAT AGE: NORMAL
GA CLIN EST: 15.7 WK
HX OF NTD NARR: NORMAL
IDDM PATIENT QL: NORMAL
INHIBIN A ADJ MOM SERPL: 0.94
INHIBIN A SERPL-MCNC: 142 PG/ML
INTEGRATED SCN PATIENT-IMP: NORMAL
IVF PREGNANCY: NORMAL
NEURAL TUBE DEFECT RISK FETUS: NORMAL %
PAPP-A MOM SERPL: 0.57
PAPP-A SERPL-MCNC: 515.5 NG/ML
PHYSICIAN NPI: NORMAL
SL AMB NASAL BONE: PRESENT
SL AMB REFERRING PHYSICIAN NAME: NORMAL
SL AMB REFERRING PHYSICIAN PHONE: NORMAL
SL AMB REPEAT SPECIMEN: NORMAL
SL AMB SPECIMEN # FROM PART 1: NORMAL
SL AMB TWIN B NASAL BONE: NORMAL
TS 18 RISK FETUS: NORMAL
TS 21 RISK FETUS: NORMAL
U ESTRIOL ADJ MOM SERPL: 1.55
U ESTRIOL SERPL-MCNC: 0.97 NG/ML
US DATE: NORMAL

## 2022-09-26 ENCOUNTER — OFFICE VISIT (OUTPATIENT)
Dept: PHYSICAL THERAPY | Facility: CLINIC | Age: 22
End: 2022-09-26
Payer: COMMERCIAL

## 2022-09-26 DIAGNOSIS — M54.9 BACK PAIN AFFECTING PREGNANCY IN FIRST TRIMESTER: Primary | ICD-10-CM

## 2022-09-26 DIAGNOSIS — O99.891 BACK PAIN AFFECTING PREGNANCY IN FIRST TRIMESTER: Primary | ICD-10-CM

## 2022-09-26 PROCEDURE — 97112 NEUROMUSCULAR REEDUCATION: CPT

## 2022-09-26 PROCEDURE — 97110 THERAPEUTIC EXERCISES: CPT

## 2022-09-26 NOTE — PROGRESS NOTES
Daily Note     Today's date: 2022  Patient name: Vazquez Romero  : 2000  MRN: [id number]  Referring provider: Cristian Damian MD  Dx:   Encounter Diagnosis     ICD-10-CM    1  Back pain affecting pregnancy in first trimester  O99 891     M54 9                   Subjective: Pt reports increase soreness as she tired to do a workout video that focused on core work  States that she is also starting to have some pain in her lower abdomin and states that she feels it may be the beginning of round ligament pain  Objective: See treatment diary below      Assessment: Tolerated treatment well  Patient demonstrated fatigue post treatment, exhibited good technique with therapeutic exercises and would benefit from continued PT      Plan: Continue per plan of care  Precautions: standard       Manuals 09/15/22 09/26/22             Adominal massage- gentle im                                                   Neuro Re-Ed             TA iso  requires cues x 15 X 20            Bridges  X 15 w/ TA iso  X 15            Clamshells  X 20 ea  X 20 Ta iso              TA w/ TA heels ext x 20                                                   Ther Ex             Jacklyn stretch  X 15 ea    X 20            Happy baby  X 10 5s hold  X 10 5s            Child pose  X 10 5s  X 10 5s            Cat camel  X 10 5s hold  X 10 5s            Modified birddog  X 10 B 3s hold  X 10 B                                                   Ther Activity                                       Gait Training                                       Modalities

## 2022-09-26 NOTE — RESULT ENCOUNTER NOTE
I have reviewed the patient's lab results which are normal   Please contact the patient to inform her of the normal results   If Ms Mei Darling has not already reviewed them using Jordyn Clements MD

## 2022-09-28 LAB
LAB AP GYN PRIMARY INTERPRETATION: NORMAL
Lab: NORMAL

## 2022-10-03 ENCOUNTER — OFFICE VISIT (OUTPATIENT)
Dept: PHYSICAL THERAPY | Facility: CLINIC | Age: 22
End: 2022-10-03
Payer: COMMERCIAL

## 2022-10-03 DIAGNOSIS — O99.891 BACK PAIN AFFECTING PREGNANCY IN FIRST TRIMESTER: Primary | ICD-10-CM

## 2022-10-03 DIAGNOSIS — M54.9 BACK PAIN AFFECTING PREGNANCY IN FIRST TRIMESTER: Primary | ICD-10-CM

## 2022-10-03 PROCEDURE — 97140 MANUAL THERAPY 1/> REGIONS: CPT

## 2022-10-03 PROCEDURE — 97112 NEUROMUSCULAR REEDUCATION: CPT

## 2022-10-03 PROCEDURE — 97110 THERAPEUTIC EXERCISES: CPT

## 2022-10-03 NOTE — PROGRESS NOTES
Daily Note     Today's date: 10/3/2022  Patient name: Gavin Shen  : 2000  MRN: [id number]  Referring provider: Leland Hardy MD  Dx:   Encounter Diagnosis     ICD-10-CM    1  Back pain affecting pregnancy in first trimester  O99 891     M54 9                   Subjective: Pt reports that she has noted some improvements in her symptoms in the last week  Complains of some soreness however, this is located in her abdominal muscles as she is doing HEP regularly and reports "abs are engaging like never before"  Denies any pain today  Objective: See treatment diary below      Assessment: Tolerated treatment fair  Patient demonstrates improvements in muscle enagagement, edurance, and strength  she will continue to benefit from skilled physical therapy in order to optimize strength and improve quality of life  Plan: Continue per plan of care  Precautions: standard       Manuals 09/15/22 09/26/22 10/03/22            Adominal massage- gentle im  Abdominal massage- IM              LAAD-IM              grasoton to hip add - iM                        Neuro Re-Ed             TA iso  requires cues x 15 X 20  X 20           Bridges  X 15 w/ TA iso  X 15  x20           Clamshells  X 20 ea  X 20 Ta iso  X 20 B             TA w/ TA heels ext x 20  X 20 B              pallof press x 10             TA w/ rows and ext x 10 B                        Ther Ex             Jacklyn stretch  X 15 ea    X 20  X 10' 5           Happy baby  X 10 5s hold  X 10 5s  X 10 5s           Child pose  X 10 5s  X 10 5s  X 10 5s           Cat camel  X 10 5s hold  X 10 5s  X 10 5s           Modified birddog  X 10 B 3s hold  X 10 B  X 10 B                                                  Ther Activity                                       Gait Training                                       Modalities

## 2022-10-11 ENCOUNTER — OFFICE VISIT (OUTPATIENT)
Dept: PHYSICAL THERAPY | Facility: CLINIC | Age: 22
End: 2022-10-11
Payer: COMMERCIAL

## 2022-10-11 DIAGNOSIS — O99.891 BACK PAIN AFFECTING PREGNANCY IN FIRST TRIMESTER: Primary | ICD-10-CM

## 2022-10-11 DIAGNOSIS — M54.9 BACK PAIN AFFECTING PREGNANCY IN FIRST TRIMESTER: Primary | ICD-10-CM

## 2022-10-11 PROCEDURE — 97140 MANUAL THERAPY 1/> REGIONS: CPT

## 2022-10-11 PROCEDURE — 97112 NEUROMUSCULAR REEDUCATION: CPT

## 2022-10-11 PROCEDURE — 97110 THERAPEUTIC EXERCISES: CPT

## 2022-10-11 NOTE — PROGRESS NOTES
Daily Note     Today's date: 10/11/2022  Patient name: Isabel Valente  : 2000  MRN: [id number]  Referring provider: Juancho Giron MD  Dx:   Encounter Diagnosis     ICD-10-CM    1  Back pain affecting pregnancy in first trimester  O99 891     M54 9                   Subjective: Pt reports that she does have increase pain in her low back  States that over the weekend she noted increase pain with walking and standing for long periods of time  Pain level today is 4/10  Objective: See treatment diary below      Assessment: Tolerated treatment well  Patient responded well to manual treatment with improvements in symptoms  Pt requires cues to engage TA muscles appropriately  Pt had  trial of K-tape done during today's session and responded positively to it  Pt will continue to benefit form skilled physcial therapy in order to optimize strength and improve quality of movemnet  Plan: Continue per plan of care  POC expires Auth Status Total   Visits  Start date  Expiration date PT/OT + Visit Limit?  Co-Insurance   Auth approved 12 visits -  Auth# [id number]   Auth approved 12 visits -  Auth# [id number]   33   No                                             Visit/Unit Tracking  AUTH Status: approved Date 08/23 09/15 09/26 10/03   10/11 foto          Visits  Authed: 12 Used 1 2 3 4 5            Remaining                       Dummy Auth Tracking  1 2 3 4 5 6   7 8 9 10 11 12   13 14 15 16 17 18   19 20 21 22 23 24   25 26 27 28 29 30   31 32 33 34 35 36     Precautions: standard       Manuals 09/15/22 09/26/22 10/03/22 10/11           Adominal massage- gentle im  Abdominal massage- IM  STM L-S pvm, mutlifidus, and prirformis im             LAAD-IM  LAD- IM             grasoton to hip add - iM  Hip PROM all directions- im                       Neuro Re-Ed             TA iso  requires cues x 15 X 20  X 20  X 20 5s          Bridges  X 15 w/ TA iso  X 15  x20  X 20 standard    X 15 w/ march     X 15 PPT/ TA activation          Clamshells  X 20 ea  X 20 Ta iso  X 20 B  X 20 B           TA w/ TA heels ext x 20  X 20 B  X 20 B             pallof press x 10 pallof press x 15 RTB             TA w/ rows and ext x 10 B  TA w/ rows and ext x 10 B                       Ther Ex             Jacklyn stretch  X 15 ea    X 20  X 10' 5  10s x 5         Happy baby  X 10 5s hold  X 10 5s  X 10 5s  10s x 5         Child pose  X 10 5s  X 10 5s  X 10 5s  10s x 5         Cat camel  X 10 5s hold  X 10 5s  X 10 5s  10s x 5         Modified birddog  X 10 B 3s hold  X 10 B  X 10 B  10s x 5                                                Ther Activity                                       Gait Training                                       Modalities

## 2022-10-13 ENCOUNTER — ROUTINE PRENATAL (OUTPATIENT)
Dept: OBGYN CLINIC | Facility: CLINIC | Age: 22
End: 2022-10-13
Payer: COMMERCIAL

## 2022-10-13 VITALS — SYSTOLIC BLOOD PRESSURE: 112 MMHG | BODY MASS INDEX: 33.47 KG/M2 | WEIGHT: 195 LBS | DIASTOLIC BLOOD PRESSURE: 60 MMHG

## 2022-10-13 DIAGNOSIS — Z23 NEED FOR INFLUENZA VACCINATION: ICD-10-CM

## 2022-10-13 DIAGNOSIS — Z3A.19 19 WEEKS GESTATION OF PREGNANCY: Primary | ICD-10-CM

## 2022-10-13 DIAGNOSIS — O21.9 NAUSEA AND VOMITING IN PREGNANCY: ICD-10-CM

## 2022-10-13 DIAGNOSIS — E66.9 OBESITY (BMI 30.0-34.9): ICD-10-CM

## 2022-10-13 PROCEDURE — 90686 IIV4 VACC NO PRSV 0.5 ML IM: CPT | Performed by: OBSTETRICS & GYNECOLOGY

## 2022-10-13 PROCEDURE — PNV: Performed by: OBSTETRICS & GYNECOLOGY

## 2022-10-13 PROCEDURE — 90471 IMMUNIZATION ADMIN: CPT | Performed by: OBSTETRICS & GYNECOLOGY

## 2022-10-13 RX ORDER — ONDANSETRON 8 MG/1
8 TABLET, ORALLY DISINTEGRATING ORAL EVERY 8 HOURS PRN
Qty: 20 TABLET | Refills: 0 | Status: SHIPPED | OUTPATIENT
Start: 2022-10-13

## 2022-10-13 NOTE — PROGRESS NOTES
25 y o   female at 25w3d (Estimated Date of Delivery: 3/7/23) for PNV  Pre- Vitals    Flowsheet Row Most Recent Value   Prenatal Assessment    Fetal Heart Rate 140   Movement Absent   Prenatal Vitals    Blood Pressure 112/60   Weight - Scale 88 5 kg (195 lb)   Urine Albumin/Glucose    Dilation/Effacement/Station    Vaginal Drainage    Edema    LLE Edema None   RLE Edema None   Facial Edema None         kg (1 lb)    Vijay Drum presents for return OB visit  Feeling well and has no complaints  Discussed upcoming travel to [facility]  Travel precautions reviewed  Level 2 US scheduled for 10/18/22  Flu shot administered today  Follow up in 4 weeks

## 2022-10-15 NOTE — PATIENT INSTRUCTIONS
Thank you for choosing us for your  care today  If you have any questions about your ultrasound or care, please do not hesitate to contact us or your primary obstetrician  Some general instructions for your pregnancy are:    Protect against coronavirus: get vaccinated - pregnant women are increased risk of severe COVID  Notify your primary care doctor if you have any symptoms  Exercise: Aim for 22 minutes per day (150 minutes per week) of regular exercise  Walking is great! Nutrition: aim for calcium-rich and iron-rich foods as well as healthy sources of protein  Learn about Preeclampsia: preeclampsia is a common, serious high blood pressure complication in pregnancy  A blood pressure of 240ZGGW (systolic or top number) or 56BFRP (diastolic or bottom number) is not normal and needs evaluation by your doctor  Aspirin is sometimes prescribed in early pregnancy to prevent preeclampsia in women with risk factors - ask your obstetrician if you should be on this medication  If you smoke, try to reduce how many cigarettes you smoke or try to quit completely  Do not vape  Other warning signs to watch out for in pregnancy or postpartum: chest pain, obstructed breathing or shortness of breath, seizures, thoughts of hurting yourself or your baby, bleeding, a painful or swollen leg, fever, or headache (see [facility] POST-BIRTH Warning Signs campaign)  If these happen call 911  Itching is also not normal in pregnancy and if you experience this, especially over your hands and feet, potentially worse at night, notify your doctors

## 2022-10-18 ENCOUNTER — ROUTINE PRENATAL (OUTPATIENT)
Dept: PERINATAL CARE | Facility: OTHER | Age: 22
End: 2022-10-18
Payer: COMMERCIAL

## 2022-10-18 ENCOUNTER — OFFICE VISIT (OUTPATIENT)
Dept: PHYSICAL THERAPY | Facility: CLINIC | Age: 22
End: 2022-10-18
Payer: COMMERCIAL

## 2022-10-18 VITALS
SYSTOLIC BLOOD PRESSURE: 118 MMHG | BODY MASS INDEX: 33.5 KG/M2 | HEART RATE: 90 BPM | HEIGHT: 64 IN | WEIGHT: 196.21 LBS | DIASTOLIC BLOOD PRESSURE: 70 MMHG

## 2022-10-18 DIAGNOSIS — Z36.3 ENCOUNTER FOR ANTENATAL SCREENING FOR MALFORMATIONS: Primary | ICD-10-CM

## 2022-10-18 DIAGNOSIS — M54.9 BACK PAIN AFFECTING PREGNANCY IN FIRST TRIMESTER: Primary | ICD-10-CM

## 2022-10-18 DIAGNOSIS — O99.212 OBESITY AFFECTING PREGNANCY IN SECOND TRIMESTER: ICD-10-CM

## 2022-10-18 DIAGNOSIS — O99.891 BACK PAIN AFFECTING PREGNANCY IN FIRST TRIMESTER: Primary | ICD-10-CM

## 2022-10-18 DIAGNOSIS — Z36.86 ENCOUNTER FOR ANTENATAL SCREENING FOR CERVICAL LENGTH: ICD-10-CM

## 2022-10-18 PROCEDURE — 97110 THERAPEUTIC EXERCISES: CPT

## 2022-10-18 PROCEDURE — 76817 TRANSVAGINAL US OBSTETRIC: CPT | Performed by: OBSTETRICS & GYNECOLOGY

## 2022-10-18 PROCEDURE — 76805 OB US >/= 14 WKS SNGL FETUS: CPT | Performed by: OBSTETRICS & GYNECOLOGY

## 2022-10-18 PROCEDURE — 97112 NEUROMUSCULAR REEDUCATION: CPT

## 2022-10-18 PROCEDURE — 97140 MANUAL THERAPY 1/> REGIONS: CPT

## 2022-10-18 NOTE — PROGRESS NOTES
Daily Note     Today's date: 10/18/2022  Patient name: Aaron Younger  : 2000  MRN: [id number]  Referring provider: Guille Samuel MD  Dx:   Encounter Diagnosis     ICD-10-CM    1  Back pain affecting pregnancy in first trimester  O99 891     M54 9                   Subjective: Pt reports that K-tape helped improved symptoms and states that she felt it helped provide additional support when she was doing her ADLs  Pt reports that she was able to walk a little longer then usually however, states that after she sat down for a while she noted increase pain and soreness  Pain level today is 4/10  Objective: See treatment diary below      Assessment: Tolerated treatment well  Patient demonstrated fatigue post treatment, exhibited good technique with therapeutic exercises and would benefit from continued PT      Plan: Continue per plan of care  POC expires Auth Status Total   Visits  Start date  Expiration date PT/OT + Visit Limit?  Co-Insurance   Auth approved 12 visits -  Auth# [id number]   Auth approved 12 visits -  Auth# [id number]   49   No                                             Visit/Unit Tracking  AUTH Status: approved Date 08/23 09/15 09/26 10/03   10/11 foto          Visits  Authed: 12 Used 1 2 3 4 5            Remaining                       Dummy Auth Tracking  1 2 3 4 5 6   7 8 9 10 11 12   13 14 15 16 17 18   19 20 21 22 23 24   25 26 27 28 29 30   31 32 33 34 35 36     Precautions: standard       Manuals 09/15/22 09/26/22 10/03/22 10/11 10/18          Adominal massage- gentle im  Abdominal massage- IM  STM L-S pvm, mutlifidus, and prirformis im  STM LS PVM and piriformis            LAAD-IM  LAD- IM  LAD           grasoton to hip add - iM  Hip PROM all directions- im                       Neuro Re-Ed             TA iso  requires cues x 15 X 20  X 20  X 20 5s  X 20 5s hold         Bridges  X 15 w/ TA iso  X 15  x20  X 20 standard    X 15 w/ march     X 15 PPT/ TA activation  X 15 standard  X 10 w/ march x 5 B SLR         Clamshells  X 20 ea  X 20 Ta iso  X 20 B  X 20 B X 20 B           TA w/ TA heels ext x 20  X 20 B  X 20 B  X 20 B            pallof press x 10 pallof press x 15 RTB  Quad sets x 20 5s hold            TA w/ rows and ext x 10 B  TA w/ rows and ext x 10 B                       Ther Ex             Jacklyn stretch  X 15 ea    X 20  X 10' 5  10s x 5 10s x 5        Happy baby  X 10 5s hold  X 10 5s  X 10 5s  10s x 5 LTR x 10 B         Child pose  X 10 5s  X 10 5s  X 10 5s  10s x 5 10s x 5        Cat camel  X 10 5s hold  X 10 5s  X 10 5s  10s x 5 Open book x 10 B         Modified birddog  X 10 B 3s hold  X 10 B  X 10 B  10s x 5                                                Ther Activity                                       Gait Training                                       Modalities

## 2022-10-18 NOTE — PROGRESS NOTES
[address]: Ms Travis Dickerson was seen today for anatomic survey and cervical length screening ultrasound  See ultrasound report under "OB Procedures" tab  Please don't hesitate to contact our office with any concerns or questions    Susan Walker

## 2022-10-26 ENCOUNTER — TELEPHONE (OUTPATIENT)
Dept: OBGYN CLINIC | Facility: CLINIC | Age: 22
End: 2022-10-26

## 2022-10-26 NOTE — TELEPHONE ENCOUNTER
It sounds like she is really doing all the things      She could consider evaluation with chiropractor or massage therapist

## 2022-10-26 NOTE — TELEPHONE ENCOUNTER
Patient called, she is 21 weeks pregnant  She is calling with lower back pain  She has a hx of Sciatica and an injury back in high school  Since she has been pregnant she has been having worsening lower back pain  She is doing tylenol, she is taking the maximum amount everyday, she is using heat and is going to PT  She states sometimes the pain is dull but around 3 times a day she has severe pain  She is having pain lifting her legs  Prior to pregnancy she would take naproxen at night and it would help  She is doing all the things I would recommend  She does seem like she is uncomfortable  What would you recommend at this point in time?

## 2022-10-27 ENCOUNTER — APPOINTMENT (OUTPATIENT)
Dept: PHYSICAL THERAPY | Facility: CLINIC | Age: 22
End: 2022-10-27

## 2022-11-04 ENCOUNTER — ROUTINE PRENATAL (OUTPATIENT)
Dept: OBGYN CLINIC | Facility: CLINIC | Age: 22
End: 2022-11-04

## 2022-11-04 VITALS — SYSTOLIC BLOOD PRESSURE: 118 MMHG | DIASTOLIC BLOOD PRESSURE: 64 MMHG | BODY MASS INDEX: 33.99 KG/M2 | WEIGHT: 198 LBS

## 2022-11-04 DIAGNOSIS — O99.212 OBESITY AFFECTING PREGNANCY IN SECOND TRIMESTER: Primary | ICD-10-CM

## 2022-11-04 DIAGNOSIS — Z3A.22 22 WEEKS GESTATION OF PREGNANCY: ICD-10-CM

## 2022-11-04 NOTE — PROGRESS NOTES
Patient is a 24 YO Jana Karthikeyan female presenting to the office at 22 3 weeks for routine OB care     BP: 118/64  TWlb  Fetal Movement: Yes good movement  Feeling well today  Denies LOF, CTX, VB  Has 32 week growth US scheduled  28 week labs ordered  Reviewed Precautions  Recommend decrease carb intake  Call for concerns  RTO 4 weeks

## 2022-11-10 ENCOUNTER — TELEPHONE (OUTPATIENT)
Dept: NEUROLOGY | Facility: CLINIC | Age: 22
End: 2022-11-10

## 2022-11-10 NOTE — TELEPHONE ENCOUNTER
Left detailed message for patient making her aware of appointment time and location change  Old appt 11/23/2022 in  [address] appointment is 11/16/2022 at [phone] with Hortencia Ac and phone number was included

## 2022-11-11 ENCOUNTER — TELEPHONE (OUTPATIENT)
Dept: NEUROLOGY | Facility: CLINIC | Age: 22
End: 2022-11-11

## 2022-11-21 ENCOUNTER — EVALUATION (OUTPATIENT)
Dept: PHYSICAL THERAPY | Facility: CLINIC | Age: 22
End: 2022-11-21

## 2022-11-21 DIAGNOSIS — G89.29 CHRONIC BILATERAL LOW BACK PAIN WITHOUT SCIATICA: Primary | ICD-10-CM

## 2022-11-21 DIAGNOSIS — M54.50 CHRONIC BILATERAL LOW BACK PAIN WITHOUT SCIATICA: Primary | ICD-10-CM

## 2022-11-21 DIAGNOSIS — Z3A.23 23 WEEKS GESTATION OF PREGNANCY: ICD-10-CM

## 2022-11-22 NOTE — PROGRESS NOTES
PT Re-Evaluation  and PT Discharge    Today's date: 2022  Patient name: Maged Fischer  : 2000  MRN: [id number]  Referring provider: Reyes Brock MD  Dx:   Encounter Diagnosis     ICD-10-CM    1  Chronic bilateral low back pain without sciatica  M54 50     G89 29       2  23 weeks gestation of pregnancy  Z3A 23                      Assessment  Assessment details: Update 22: Pt reports that she is feeling well  Has increase of awareness of muscle activity, increase strength, and increase ROM  Pt has met all personal and most of her therapeutic goals since starting therapy; she is ready to be officially discharged to an HEP at this time  Pt will be officially discharged to an HEP at this time  Maged Fischer is a 25 y o  female who presents with pain, decreased strength, decreased ROM, decreased joint mobility and postural dysfunction  Due to these impairments, patient has difficulty performing ADL's, recreational activities, lifting/carrying, transfers, reaching  Patient's clinical presentation is consistent with their referring diagnosis of Back pain affecting pregnancy in first trimester  (primary encounter diagnosis)  Plan: Ambulatory Referral to Physical Therapy    Patient has been educated in home exercise program and plan of care   Patient would benefit from skilled physical therapy services to address their aforementioned functional limitations and progress towards prior level of function and independence with home exercise program      Impairments: abnormal gait, abnormal or restricted ROM, activity intolerance, impaired physical strength, lacks appropriate home exercise program, pain with function, poor posture  and poor body mechanics    Goals  Short term goals to be accomplished in 3 weeks:  STG 1: Pt will demo independence with postural management met   STG 2: Pt will demo I with HEP to maximize progress between therapy sessions met   STG 3: Pt will demo L/S AROM < or = min loss throughout to promote improved functional mobility and body mechanics met   STG 4: Pt will demo 1/2 MMT grade core stabilizers to improve lumbar stability with functional challenges met   STG 5: Pt will reports pain dec freq and intensity 50% met   STG 6: Pt will deny sleep disturbance met     Long term goals to be accomplished in 6 weeks:  LTG 1: Pt will demo good body mech with >75% functional challenges to prevent reinjury met   LTG 2: Pt will be able to return to walking activity for 30 minutes/hours pain free as per PLOF met   LTG 3: Pt will demo Good strength core stabilizers to promote carryover with body mech and posture met     Plan  Plan details:  HEP development, stretching, strengthening, A/AA/PROM, joint mobilizations, posture education, STM/MI as needed to reduce muscle tension, muscle reeducation, patient has been educated in Dx, prognosis and plan of care and is in agreement    Patient would benefit from: PT eval and skilled physical therapy  Planned modality interventions: cryotherapy and thermotherapy: hydrocollator packs  Planned therapy interventions: manual therapy, neuromuscular re-education, self care, therapeutic activities, therapeutic exercise and home exercise program  Treatment plan discussed with: patient        Subjective Evaluation    History of Present Illness  Mechanism of injury: Pt reports that she has had pain in her low back for quite some time  States that due to her recent pregnancy she has had an increase in symptoms  She has pain when walking for long periods of time,sitting for a while, going from a sit to stand, and doing vigorous activity  States that at times she has numbness and tingling down her right leg  She reports that she does have a very sedentary lifestyle due to lack of time and busy schedule     Pain  Current pain ratin  At best pain ratin  At worst pain ratin  Quality: sharp, knife-like, pulling, tight and discomfort  Relieving factors: rest, change in position, heat and ice  Aggravating factors: walking, stair climbing, sitting, running and lifting  Progression: no change    Social Support  Steps to enter house: yes  Stairs in house: yes   Lives with: spouse and significant other    Employment status: working  Treatments  Previous treatment: physical therapy  Current treatment: physical therapy  Patient Goals  Patient goals for therapy: increased strength, independence with ADLs/IADLs, return to work, return to [employer] activities and decreased pain          Objective     Postural Observations  Seated posture: fair  Standing posture: fair        Active Range of Motion     Lumbar   Normal active range of motion    Strength/Myotome Testing     Left Hip   Planes of Motion   Flexion: 3+  Extension: 3+  Abduction: 3+  External rotation: 4    Right Hip   Planes of Motion   Flexion: 3+  Extension: 3+  Abduction: 3+  External rotation: 4    Left Knee   Flexion: 4  Extension: 4    Right Knee   Flexion: 4  Extension: 4    Muscle Activation   Patient unable to activate left transverse abdominals, left multifidus, right transverse abdominals and right multifidus  Precautions: standard         Manuals 09/15/22 09/26/22 10/03/22 10/11 10/18 11/21         Adominal massage- gentle im  Abdominal massage- IM  STM L-S pvm, mutlifidus, and prirformis im  STM LS PVM and piriformis  STM LS PVM and piriformis           LAAD-IM  LAD- IM  LAD LAD           grasoton to hip add - iM  Hip PROM all directions- im   Hip PROM all directions- IM                     Neuro Re-Ed             TA iso  requires cues x 15 X 20  X 20  X 20 5s  X 20 5s hold  X 20 5s hold        Bridges  X 15 w/ TA iso  X 15  x20  X 20 standard    X 15 w/ march     X 15 PPT/ TA activation  X 15 standard  X 10 w/ march x 5 B SLR  X 15 standard x 10 w/ march x 5 B SLR        Clamshells  X 20 ea    X 20 Ta iso  X 20 B  X 20 B X 20 B  X 20 B          TA w/ TA heels ext x 20  X 20 B X 20 B  X 20 B  X 20 B           pallof press x 10 pallof press x 15 RTB  Quad sets x 20 5s hold  quad sets x 20 5s hold           TA w/ rows and ext x 10 B  TA w/ rows and ext x 10 B   TA w/ rows and ext x 10 B                     Ther Ex             Jacklyn stretch  X 15 ea    X 20  X 10' 5  10s x 5 10s x 5 10s B        Happy baby  X 10 5s hold  X 10 5s  X 10 5s  10s x 5 LTR x 10 B  LTR x 10 B        Child pose  X 10 5s  X 10 5s  X 10 5s  10s x 5 10s x 5 10s x 5       Cat camel  X 10 5s hold  X 10 5s  X 10 5s  10s x 5 Open book x 10 B  open book x 10 B        Modified birddog  X 10 B 3s hold  X 10 B  X 10 B  10s x 5  10s x 5                                              Ther Activity                                       Gait Training                                       Modalities

## 2022-12-01 ENCOUNTER — APPOINTMENT (OUTPATIENT)
Dept: LAB | Facility: HOSPITAL | Age: 22
End: 2022-12-01

## 2022-12-01 DIAGNOSIS — O99.810 ABNORMAL GLUCOSE TOLERANCE TEST DURING PREGNANCY, ANTEPARTUM: ICD-10-CM

## 2022-12-01 DIAGNOSIS — O99.212 OBESITY AFFECTING PREGNANCY IN SECOND TRIMESTER: Primary | ICD-10-CM

## 2022-12-01 DIAGNOSIS — O99.212 OBESITY AFFECTING PREGNANCY IN SECOND TRIMESTER: ICD-10-CM

## 2022-12-01 DIAGNOSIS — Z3A.22 22 WEEKS GESTATION OF PREGNANCY: ICD-10-CM

## 2022-12-01 LAB
CRP SERPL QL: 8.2 MG/L
ERYTHROCYTE [DISTWIDTH] IN BLOOD BY AUTOMATED COUNT: 12.9 % (ref 11.6–15.1)
ERYTHROCYTE [SEDIMENTATION RATE] IN BLOOD: 27 MM/HOUR (ref 0–19)
GLUCOSE 1H P 50 G GLC PO SERPL-MCNC: 140 MG/DL (ref 40–134)
HCT VFR BLD AUTO: 34.6 % (ref 34.8–46.1)
HGB BLD-MCNC: 11.6 G/DL (ref 11.5–15.4)
MCH RBC QN AUTO: 30 PG (ref 26.8–34.3)
MCHC RBC AUTO-ENTMCNC: 33.5 G/DL (ref 31.4–37.4)
MCV RBC AUTO: 89 FL (ref 82–98)
PLATELET # BLD AUTO: 227 THOUSANDS/UL (ref 149–390)
PMV BLD AUTO: 8.3 FL (ref 8.9–12.7)
RBC # BLD AUTO: 3.87 MILLION/UL (ref 3.81–5.12)
TSH SERPL DL<=0.05 MIU/L-ACNC: 0.92 UIU/ML (ref 0.45–4.5)
WBC # BLD AUTO: 9.36 THOUSAND/UL (ref 4.31–10.16)

## 2022-12-02 ENCOUNTER — ROUTINE PRENATAL (OUTPATIENT)
Dept: OBGYN CLINIC | Facility: CLINIC | Age: 22
End: 2022-12-02

## 2022-12-02 VITALS — WEIGHT: 200 LBS | SYSTOLIC BLOOD PRESSURE: 110 MMHG | DIASTOLIC BLOOD PRESSURE: 76 MMHG | BODY MASS INDEX: 34.33 KG/M2

## 2022-12-02 DIAGNOSIS — Z3A.26 26 WEEKS GESTATION OF PREGNANCY: ICD-10-CM

## 2022-12-02 DIAGNOSIS — Z34.82 ENCOUNTER FOR SUPERVISION OF OTHER NORMAL PREGNANCY, SECOND TRIMESTER: Primary | ICD-10-CM

## 2022-12-02 LAB — RPR SER QL: NORMAL

## 2022-12-02 NOTE — PROGRESS NOTES
25 y o    female at 34 1 wga for PNV  BP : 110/76  TW  Feeling well    No complaints  Reviewed labs - will do 3h before next appt  Had flu vaccine, recommend covid vaccine  Reviewed PTL precautions   F/u 2 weeks

## 2022-12-03 LAB — 1,25(OH)2D3 SERPL-MCNC: 106 PG/ML (ref 24.8–81.5)

## 2022-12-05 ENCOUNTER — TELEPHONE (OUTPATIENT)
Dept: NEUROLOGY | Facility: CLINIC | Age: 22
End: 2022-12-05

## 2022-12-06 ENCOUNTER — APPOINTMENT (OUTPATIENT)
Dept: LAB | Facility: HOSPITAL | Age: 22
End: 2022-12-06

## 2022-12-06 ENCOUNTER — OFFICE VISIT (OUTPATIENT)
Dept: NEUROLOGY | Facility: CLINIC | Age: 22
End: 2022-12-06

## 2022-12-06 VITALS
RESPIRATION RATE: 18 BRPM | HEIGHT: 62 IN | BODY MASS INDEX: 36.84 KG/M2 | SYSTOLIC BLOOD PRESSURE: 128 MMHG | WEIGHT: 200.2 LBS | TEMPERATURE: 95.7 F | OXYGEN SATURATION: 99 % | DIASTOLIC BLOOD PRESSURE: 76 MMHG | HEART RATE: 95 BPM

## 2022-12-06 DIAGNOSIS — G44.229 CHRONIC TENSION-TYPE HEADACHE, NOT INTRACTABLE: Primary | ICD-10-CM

## 2022-12-06 DIAGNOSIS — O24.410 DIET CONTROLLED GESTATIONAL DIABETES MELLITUS (GDM) IN SECOND TRIMESTER: Primary | ICD-10-CM

## 2022-12-06 DIAGNOSIS — O99.810 ABNORMAL GLUCOSE TOLERANCE TEST DURING PREGNANCY, ANTEPARTUM: ICD-10-CM

## 2022-12-06 DIAGNOSIS — G43.829 MENSTRUAL MIGRAINE WITHOUT STATUS MIGRAINOSUS, NOT INTRACTABLE: ICD-10-CM

## 2022-12-06 DIAGNOSIS — Z3A.27 27 WEEKS GESTATION OF PREGNANCY: ICD-10-CM

## 2022-12-06 PROBLEM — E55.9 VITAMIN D DEFICIENCY: Status: RESOLVED | Noted: 2022-03-02 | Resolved: 2022-12-06

## 2022-12-06 LAB — GLUCOSE P FAST SERPL-MCNC: 98 MG/DL (ref 65–94)

## 2022-12-06 NOTE — PROGRESS NOTES
Patient ID: Janay Teran is a 25 y o  female  Assessment/Plan:    Chronic tension-type headache, not intractable  Gabriele Vitale is a 25year old female who presents to the office in follow up for headache management  She is currently 26 weeks pregnant and has had no headaches during her pregnancy  She plans on breastfeeding until she returns to work in July 2023  I have encouraged her to ensure she remains well hydrated and is not skipping meals especially after she is postpartum and while breastfeeding  We discussed it is likely while she breastfeeds that her headaches will remain controlled, however with discontinuation and changes in her hormones it is likely she will have a reoccurrence  We will plan to have her follow up in June 2023, sooner if needed  Plan:  - Tylenol as needed  - Naratriptan on a sparing basis as needed (has not needed this to date)  - Continue prenatal vitamins  - Discontinue additional Vitamin D supplementation (while taking prenatal)  - Follow up in June, sooner if needed  Menstrual migraine without status migrainosus, not intractable  Gabriele Vitale is a 25year old female who presents to the office in follow up for headache management  She is currently 26 weeks pregnant and has had no headaches during her pregnancy  She plans on breastfeeding until she returns to work in July 2023  I have encouraged her to ensure she remains well hydrated and is not skipping meals especially after she is postpartum and while breastfeeding  We discussed it is likely while she breastfeeds that her headaches will remain controlled, however with discontinuation and changes in her hormones it is likely she will have a reoccurrence  We will plan to have her follow up in June 2023, sooner if needed      Plan:  - Tylenol as needed  - Naratriptan on a sparing basis as needed (has not needed this to date)  - Continue prenatal vitamins  - Discontinue additional Vitamin D supplementation (while taking prenatal)  - Follow up in June, sooner if needed  Diagnoses and all orders for this visit:    Chronic tension-type headache, not intractable    Menstrual migraine without status migrainosus, not intractable         I have spent a total of 25 minutes in face to face time and chart review with this patient today  Subjective:    HPI  Liz Sanchez is a 25year old female who presents to the office in follow up for headache management  She was last seen 3/2/22 and at that time was started on Amitriptyline as a preventative and Naratriptan as an abortive treatment  In June she became pregnant and contacted the office and was advised to discontinue both medications and to use Tylenol prn instead and to continue vitamins (riboflavin, magnesium, and Vitamin D)  She did not complete MRI Brain imaging ordered at her consultation before becoming pregnant  She did obtain labs including sed rate, CRP, TSH, and Vitamin D earlier this week  Both her sed rate and CRP were mildly elevated, however this likely due to her current pregnancy (27 weeks gestation)  Her TSH was within normal limits and her Vitamin D was elevated above the upper limits of normal at 106 0  I did advise her to consider reducing her Vitamin D supplementation (she is taking Vit D3 2,000 iu + a prenatal vitamin which likely has vitamin D as well)  She states today she has been doing well  She has had no headaches during her pregnancy and has not needed to use Naratriptan (was cleared to use this sparingly and tylenol prn)  She states she is having low back pain with left sided sciatica for which she has recently undergone physical therapy with little relief  She continues with home low back stretching exercises and KT taping  She is upset she was diagnosed with gestational diabetes this morning and continues to follow with maternal fetal medicine  She denies any new focal neurologic complaints      3/2/22- To review from prior HPI, she states she began having headaches at age 21years old  She works as a  and has a 1 hour commute there and back  She works 11 hour shifts, 4 days on and 4 days off  She describes two distinct types of headaches:        Type 1 Headache  described as "nagging"  Currently occuring everyday  She states it is "band like" and can be squeezing/tight  Average pain 3/10  gradual onset  Lasts 12 hours  No identified triggers     Type 2 "Full blown" migraine  She states starts similar to type 1 headache but then builds gradually  Described as pressure behind the left eye, needle type pain  Can be on the right at times behind eyes as well  Pain can radiate into forehead at times  10/10 intensity  Associated with full body aches and light sensitivity  No auara  Lasts until she can go to sleep, maximum of 12 hours  Not positional, never wakes her up out of her sleep  Occurs 1-2 times a month usually with her menses  No other identified triggers     Temporal Pattern of Headaches:  Started having HA's 21 y o  Worst time of day: during the day if in the sun  Awaken from sleep?: no  Seasonal pattern?: yes - worse in the summer  'Clustering' of HA's over time? no  Overall pattern since problem began: gradually worsening migraines are worse     Degree of Functional Impairment: moderate when she has a migraine cannot get out of bed, headaches do not impair her at all      Current Use of Meds to Treat HA:  Abortive meds? Tylenol  Daily use? no  Preventive meds? none  Non-Medical/Alternative treatments for HA: peppermint oil     Additional Relevant History:  History of head/neck trauma? no  History of head/neck surgery? No- tonsils and adenoids  Family h/o headache problems? yes - mom, maternal grandmother with brain tumor  Family history of aneurysms? no  Exposure to carbon monoxide? no  Substance use: alcohol: socially, tobacco: vape daily, caffeine: coffee 2x week     Prior Evaluation/Treatments:  Have you seen another physician for your headaches? yes "Dr Tommi Felty"  Prior work-up: blood work was tested for Lyme, RA, MOUNA,- all negative Vit D- defficient  Trigger point injections? no  Botox injections? no  Epidural injections? no  Prior PREVENTATIVE medications: amitriptyline- d/c due to pregnancy 6/2022  Prior ABORTIVE mediations: acetaminophen, aspirin/acetaminophen/caffeine, Teagan Grippe      The following portions of the patient's history were reviewed and updated as appropriate: allergies, current medications, past family history, past medical history, past social history and past surgical history  Objective:    Blood pressure 128/76, pulse 95, temperature (!) 95 7 °F (35 4 °C), temperature source Tympanic, resp  rate 18, height 5' 2" (1 575 m), weight 90 8 kg (200 lb 3 2 oz), last menstrual period 05/25/2022, SpO2 99 %  Neurological Exam    On neurological examination patient is alert, awake, oriented and in no distress  Speech is fluent without dysarthria or aphasia  Cranial nerves 2-12 were symmetrically intact bilaterally  No evidence of any focal weakness or sensory loss in the upper or lower extremities  Motor testing reveals 5/5 strength of the bilateral upper and lower extremities  There was no pronator drift  No fasciculations present  No abnormal involuntary movements  Finger- to-nose reveals no tremor or ataxia and intact proprioceptive function, no dysmetria was noted  Sensation was intact to vibration, light touch, and temperature in bilateral upper and lower extremities  Deep tendon reflexes were 2+ and symmetric in the bilateral upper and lower extremities  She is able to rise easily without assistance from a seated position  Casual gait is normal including stance, stride, and arm swing  ROS:    Review of Systems  Constitutional: Negative  Negative for appetite change and fever  HENT: Negative  Negative for hearing loss, tinnitus, trouble swallowing and voice change  Eyes: Negative    Negative for photophobia, pain and visual disturbance  Respiratory: Negative  Negative for shortness of breath  Cardiovascular: Negative  Negative for palpitations  Gastrointestinal: Negative  Negative for nausea and vomiting  Endocrine: Negative  Negative for cold intolerance  Genitourinary: Positive for frequency (due to pregnancy) and urgency  Negative for dysuria  Musculoskeletal: Positive for back pain (sciatica)  Negative for gait problem, myalgias and neck pain  Skin: Negative  Negative for rash  Allergic/Immunologic: Negative  Neurological: Negative  Negative for dizziness, tremors, seizures, syncope, facial asymmetry, speech difficulty, weakness, light-headedness, numbness and headaches  Hematological: Bruises/bleeds easily  Psychiatric/Behavioral: Negative for confusion, hallucinations and sleep disturbance  The patient is nervous/anxious (anxious)        Reviewed ROS as entered by medical assistant

## 2022-12-06 NOTE — PROGRESS NOTES
Review of Systems   Constitutional: Negative  Negative for appetite change and fever  HENT: Negative  Negative for hearing loss, tinnitus, trouble swallowing and voice change  Eyes: Negative  Negative for photophobia, pain and visual disturbance  Respiratory: Negative  Negative for shortness of breath  Cardiovascular: Negative  Negative for palpitations  Gastrointestinal: Negative  Negative for nausea and vomiting  Endocrine: Negative  Negative for cold intolerance  Genitourinary: Positive for frequency (due to pregnancy) and urgency  Negative for dysuria  Musculoskeletal: Positive for back pain (sciatica)  Negative for gait problem, myalgias and neck pain  Skin: Negative  Negative for rash  Allergic/Immunologic: Negative  Neurological: Negative  Negative for dizziness, tremors, seizures, syncope, facial asymmetry, speech difficulty, weakness, light-headedness, numbness and headaches  Hematological: Bruises/bleeds easily  Psychiatric/Behavioral: Negative for confusion, hallucinations and sleep disturbance  The patient is nervous/anxious (anxious)

## 2022-12-06 NOTE — ASSESSMENT & PLAN NOTE
Keely Fletcher is a 25year old female who presents to the office in follow up for headache management  She is currently 26 weeks pregnant and has had no headaches during her pregnancy  She plans on breastfeeding until she returns to work in July 2023  I have encouraged her to ensure she remains well hydrated and is not skipping meals especially after she is postpartum and while breastfeeding  We discussed it is likely while she breastfeeds that her headaches will remain controlled, however with discontinuation and changes in her hormones it is likely she will have a reoccurrence  We will plan to have her follow up in June 2023, sooner if needed  Plan:  - Tylenol as needed  - Naratriptan on a sparing basis as needed (has not needed this to date)  - Continue prenatal vitamins  - Discontinue additional Vitamin D supplementation (while taking prenatal)  - Follow up in June, sooner if needed

## 2022-12-06 NOTE — ASSESSMENT & PLAN NOTE
Renetta Mckeon is a 25year old female who presents to the office in follow up for headache management  She is currently 26 weeks pregnant and has had no headaches during her pregnancy  She plans on breastfeeding until she returns to work in July 2023  I have encouraged her to ensure she remains well hydrated and is not skipping meals especially after she is postpartum and while breastfeeding  We discussed it is likely while she breastfeeds that her headaches will remain controlled, however with discontinuation and changes in her hormones it is likely she will have a reoccurrence  We will plan to have her follow up in June 2023, sooner if needed  Plan:  - Tylenol as needed  - Naratriptan on a sparing basis as needed (has not needed this to date)  - Continue prenatal vitamins  - Discontinue additional Vitamin D supplementation (while taking prenatal)  - Follow up in June, sooner if needed

## 2022-12-07 ENCOUNTER — DOCUMENTATION (OUTPATIENT)
Dept: PERINATAL CARE | Facility: OTHER | Age: 22
End: 2022-12-07

## 2022-12-07 ENCOUNTER — OFFICE VISIT (OUTPATIENT)
Facility: HOSPITAL | Age: 22
End: 2022-12-07

## 2022-12-07 VITALS
DIASTOLIC BLOOD PRESSURE: 70 MMHG | BODY MASS INDEX: 36.8 KG/M2 | HEART RATE: 98 BPM | SYSTOLIC BLOOD PRESSURE: 102 MMHG | HEIGHT: 62 IN | WEIGHT: 200 LBS

## 2022-12-07 DIAGNOSIS — R73.01 ELEVATED FASTING BLOOD SUGAR: ICD-10-CM

## 2022-12-07 DIAGNOSIS — Z3A.27 27 WEEKS GESTATION OF PREGNANCY: ICD-10-CM

## 2022-12-07 DIAGNOSIS — O24.410 DIET CONTROLLED GESTATIONAL DIABETES MELLITUS (GDM) IN SECOND TRIMESTER: Primary | ICD-10-CM

## 2022-12-07 DIAGNOSIS — E66.9 OBESITY (BMI 30.0-34.9): ICD-10-CM

## 2022-12-07 RX ORDER — BLOOD SUGAR DIAGNOSTIC
STRIP MISCELLANEOUS
Qty: 100 STRIP | Refills: 4 | Status: SHIPPED | OUTPATIENT
Start: 2022-12-07 | End: 2023-03-07

## 2022-12-07 RX ORDER — BLOOD-GLUCOSE METER
EACH MISCELLANEOUS
Qty: 1 KIT | Refills: 0 | Status: SHIPPED | OUTPATIENT
Start: 2022-12-07 | End: 2023-03-07

## 2022-12-07 RX ORDER — LANCETS 33 GAUGE
EACH MISCELLANEOUS
Qty: 100 EACH | Refills: 4 | Status: SHIPPED | OUTPATIENT
Start: 2022-12-07 | End: 2023-03-07

## 2022-12-07 NOTE — PROGRESS NOTES
CLASS 1 - Individual  (in-person office visit )    Thank you for referring your patient to [facility] Maternal Fetal Medicine Diabetes in Pregnancy Program      Subjective:     Abi Velarde is a 25 y o  female who presents today ALONE for Class 1 Gestational Diabetes education  Patient is at 27w1d gestation, Estimated Date of Delivery: 3/7/23  • Learns best by: Auditory (Lecture, Group Discussion)  • Patient rates own health as: Good  • Primary Support Person: Significant Other and mother   • Sherri with stress by: eating   • How do you feel the diabetes diagnosis will affect the rest of your pregnancy? "More time to plan things"     Reviewed and updated the following from patients medical record: Demographics, Education, Occupation, PMH, Problem List, Allergies, and Current Medications  D&P Assessment responses have been reported throughout the following note  Visit Diagnosis:  Gestational Diabetes      Discussed with patient:  • Pathophysiology of Gestational Diabetes   • Untreated hyperglycemia in pregnancy and maternal fetal complications (fetal macrosomia,  hypoglycemia, polyhydramnios, increased incidence of  section,  labor, and in severe cases fetal demise and still birth)  • Importance of blood glucose monitoring, nutrition, and medication if necessary in achieving BG goals  HPI    Diabetes Hx:   • Personal History? No  • Family History:  Mother     Family History   Problem Relation Age of Onset   • Cervical cancer Mother    • Esophageal cancer Father         Pregnancy Overview:   OB History    Para Term  AB Living   1             SAB IAB Ectopic Multiple Live Births                  # Outcome Date GA Lbr Krish/2nd Weight Sex Delivery Anes PTL Lv   1 Current                Pregnancy Plan:   Pregnancy: Jimenez     Labs  GDM LABS:  • 1 Hour GTT:   Lab Results   Component Value Date/Time    [id number] 140 (H) 2022 11:03 AM      • 3 Hour GTT: Lab Results   Component Value Date/Time    GLUF 98 (H) 2022 09:43 AM        A1C:  No results found for: HGBA1C     Labs Ordered This Visit: None    Current Outpatient Medications  Current Outpatient Medications   Medication Instructions   • Acetaminophen (TYLENOL PO) Oral   • diphenhydrAMINE (BENADRYL) 25 mg, Oral, Every 6 hours PRN   • Magnesium 400 mg, Oral, Daily   • ondansetron (ZOFRAN ODT) 8 mg, Oral, Every 8 hours PRN   • Prenatal Multivit-Min-Fe-FA (Pre- Formula) TABS No dose, route, or frequency recorded  • Riboflavin 400 mg, Oral, Daily        Preferred Pharmacy:   [facility] #[id number] [facility], [address] [address]  Phone: [phone] Fax: [phone]       Anthropometrics:  Ht Readings from Last 1 Encounters:   22 5' 2" (1 575 m)      Wt Readings from Last 3 Encounters:   22 90 8 kg (200 lb 3 2 oz)   22 90 7 kg (200 lb)   22 89 8 kg (198 lb)        Pre-Gravid Wt Pre-Gravid BMI TWG   88 kg (194 lb) 35 47 2 722 kg (6 lb)     Total Pregnancy Weight Gain Recommendations: BMI (> 30) 11-20 lbs  • Current Wt Status Compared to Recommendations:  Within Range -- Continue recommended rate of weight gain based on pre-pregnancy BMI till delivery    Most Recent Ultrasound Results:  • Findings:    EFW: 329 grams - 0 lbs 12 oz AC: 34 % HC: 48 %   -     10/18/22  o Further Fetal Surveillance: Beginning at 32 weeks (NST + ANASTACIA twice a week) = Not indicated at this time  • Next US date: Scheduled Appropriately 23     Blood Glucose Monitoring:     Glucose Meter: OneTouch Verio Flex   *orders for supplies (meter, lancets, strips) based on patients needs pended/routed to appropriate provider after today's visit for review/approval     Instructed on Testing Blood Sugars: 4 x per day (Fasting, 2 hour after start of each meal)  • Goals: (Fasting) 60-90mg/dl // (1hr PP) <140mg/dl // (2hr PP) <120mg/dl  • Meter Teaching: Deandra Bedolla instruction on site selection, skin preparation, loading strips and lancet device, meter activation, obtaining blood sample, test strip and lancet disposal and storage, and recording log book entries  o Blood Sugar Tested in Office? No due to as [facility]   • Instructed to report blood sugar results weekly via Phone: [phone] OR My Chart (Message with image attachment, or Glucose Flowsheet)    Meal Plan: Patient was provided with a meal plan including 3 meals and 3 snacks  *Calories: 1800 calorie (CHO: 30-71-53-64-68-74) (PRO: 2-1-3-1-3-2)    Review of Patient's Current Diet:  • Type of Diet: Regular   o Special or ethnic dietary preferences? no  o Food Allergies: None  o Food Intolerances: Red Meat and Pork   o Food Dislikes: Red Meat and Pork   • Receiving WIC or food stamps? No  • Frequency of dining out/getting take out? Twice a month     Typical Timing/Frequency of Meals and Snacks:   • # Meals Daily: 3  o Timing of Meals: Breakfast: 5 am  Lunch: 12pm  Dinner: 8pm  • # Snacks Daily? 2  o Timing of Snacks: Meal # 1: 9am  Meal #2: 4pm  Meal #3: 10pm    Meal Plan Tips Reviewed at Today's Visit:  • Appropriate amounts of CHO, PRO, and Fat at each meal and snack  • CHO exchange list, and portion sizes for both CHO and PRO via food models  • How to read a food label  • Suggested meal/snack options to increase nutrition and maintain consistent meal and snack intakes  • Eat every 2 0-3 5 hours while awake  • Go no longer than 8-10 hours fasting overnight until first meal of the day  Physical Activity:  • Currently physically active? No, Reviewed need for 20 minutes daily if approved by OB    Reviewed w/ Pt:   • Benefits of physical activity to optimize blood glucose control, encouraged activity at patient is physically able    o Instructed pt to always consult a physician prior to starting an exercise program    • Recommend 20-30 minutes daily       Patient Stated Goal: "I will check my blood sugar 4 times each day, as directed by diabetes and pregnancy team"    Expected Compliance: good  • Barriers to Learning/Change: No Barriers  • Diabetes Self Management Support Plan (outside of ongoing care): Spouse/Family     Date to Report Blood Sugars: Day Before Class 2, Then Weekly (till delivery)    Class 2: No follow-ups on file  *Patient instructed to bring 3 day food diary and/or write down foods associated with elevated after meal blood sugars    Begin Time: 1:10 pm     End Time: 2:10 pm    It was a pleasure working with them today  Please feel free to call with any questions or concerns      Stephany Fu, RN   Diabetes Educator  [facility] Maternal Fetal Medicine  Diabetes in Pregnancy Program  [address] [facility], [address] [place] [address],[address]  [address]

## 2022-12-07 NOTE — PATIENT INSTRUCTIONS
Welcome to The Diabetes and Pregnancy Program at [address] [place] [address] Dr team consists of Myself, Kaylynn Busby and three certified dieticians/educators: Rosemarie Doss (Carolyn) [facility]) [place], and [place]  Our medical assistant, Marygentry Thorpe can be reached at [phone] Monday thru Friday 8 am - 4:15 pm  We look forward to helping you manage your gestational diabetes during pregnancy  As we discussed and reviewed during class 1 please follow our instructions  Self-monitoring Blood Glucose: OneTouch Verio Flex  Always carry your meter and testing supplies with you at all times  Bring you glucose meter to all your appointments in our office  Check your blood sugar - fasting and 2 hours after the first bite of your meal    Total of 4 blood sugars each day  Fasting: No less than 8 hours and no more than 10 hours from your bedtime snack  Fasting glucose should be checked when you wake up and before you start your day  Check before you have anything to eat or drink  Goal:  Fasting blood sugar (FBS):  60-90 mg/dL  1 hour after meals:  140 or less   2 hour after meals:  120 or less     Report blood sugars by 12/15/22  Please only pick ONE way to report to our team  Choose the best option for you  We need your report to be legible with dates and blood sugar readings for FBS and 2 hours after breakfast, lunch and dinner to be distinguished  Voicemail [phone]  If no response in 24- 48 hours call [phone]  [facility]Door message - Send to Kaylynn Busby  (Medical question - non urgent)   Can include up to 3 images/attachments per message  If using the One Touch Application for your cell phone please only submit a screen shot of electronic logbook "classic"  Automatically logs and organizes your blood glucose results  [facility]Door glucose flow sheet: Click on "heart/chart symbol" on the bottom toolbar, center option  You will now see an option for "track my health"   Fill out accordingly and remember to hit the save button to submit your readings  How to check your blood sugar:  Wash your hands with soap and water or use waterless  to clean your hands  Alcohol can dry your fingers and is not recommended  Alcohol can also cause false, elevated glucose readings  AVOID scented soaps   Gather your glucose meter kit and supplies  Prepare your meter by inserting a new test strip  This will automatically turn on your meter  Make sure test strips are not   Use a new test strip each time  Prepare your lancing device by inserting the lancet               After the lancet is securely in place, twist off the top to reveal needle  Reattach lancing device top   Once lancing device top is reattached, you are now ready to prick the side of your fingertip to get a blood sample  You can adjust the depth setting as needed  We recommend to start at "4"  Apply the blood sample to test strip according to instructions  Make sure your sharp container is: heavy duty plastic, puncture-resistant lid, upright and stable, leak resistant, properly labeled  Record your blood sugar   For references and video: go to - http://[employer]    Diet:  Please follow your 1800 calorie (CHO: 40-76-98-23-73-32) (PRO:2-1-3-1-3-2) (see attachment)  -Remember 15 grams of carbohydrates = 1 serving of CHO   -Carbohydrates need to be paired with 7 grams or 1 ounce of protein for balance   -Food label:   -Start by checking the serving size on the label of packaged foods and drinks  -The amount of calories, carbs, and fats on the label is based on one serving  Be careful as many items contain more than one serving per package  First step is to check total sugars  Remember total sugar should equal 15 grams or less  Second step, check the total grams (g) of carbs in one serving  (See CHO graph conversion chart as reference)     You can calculate the number of servings of carbs in one serving by dividing the total carbs by 15  For example, if a food has 30 g of total carbs, it would be equal to 2 servings of carbs  -Carbohydrates always need to be paired with protein   -You should be consuming a minimum of 175 grams of carbohydrates daily to avoid ketosis  -Remember to eat 3 meals and 3 snacks a day  Eating every 2 to 3 5 hours during the day  -Be sure to have bedtime snack that includes at least 30 grams of carbohydrates and 2 ounces (14 grams) of protein   -Shop around the outside edge of the grocery store  This includes fresh fruits and vegetables, bulk grains, fresh meats, and fresh dairy   -Use low-heat cooking methods, such as baking, instead of high-heat cooking methods like deep frying   -Cook using healthy oils, such as olive, canola, or sunflower oil   -Eat meals and snacks regularly, preferably at the same times every day  Avoid going long periods of time without eating               -Eat some foods each day that contain healthy fats, such as avocado, nuts, seeds, and fish   -Always check the nutrition information of foods labeled as "low-fat" or "nonfat"  These foods may be higher in added sugar or refined carbs and should be avoided  Blood sugars:   -Report to our team on a weekly basis until you deliver  - If ok by your OB try adding a 20-30 minute walk after a meal to help keep glucose within range   -Continue to follow up with your OB and MFM providers as recommended   -Stay in close contact with diabetes education team    -Always have glucose available for hypoglycemia, use 15 by 15 rule  See attachment    -While sick or feeling ill, follow our sick day guidelines in our GDM booklet  See attachment    -For travel and dining out tips refer to your GDM booklet  See attachment  Before your class 2 on 12/16/22 try to keep a 3 day food log   Include everything you drink and eat for breakfast, AM snack, lunch, PM snack, dinner and bedtime snack  Our dietician will review during class 2 and can provide feedback  Very important to maintain tight glucose control during pregnancy to decrease risk factors including fetal macrosomia; birth injury; risk of ; polyhydramnios; pre-term labor; pre-eclampsia;  hypoglycemia; jaundice and stillbirth  Any questions give us a call at [phone] or send us a [employer] message to [facility], [address] RN  Diabetes Education  The Diabetes and Pregnancy Program at [facility]

## 2022-12-12 ENCOUNTER — TELEPHONE (OUTPATIENT)
Dept: OBGYN CLINIC | Facility: CLINIC | Age: 22
End: 2022-12-12

## 2022-12-12 NOTE — TELEPHONE ENCOUNTER
Reviewed kick counts with patient  She should be feeling 10 movements within 2 hours  If she doesn't feel 10 movements she should drink cold water, eat a snack and walk around  If baby still isn't meeting kick counts after that she should call the office

## 2022-12-12 NOTE — TELEPHONE ENCOUNTER
----- Message from Beth Gregg sent at 12/11/2022 12:58 PM EST -----  Regarding: Babies Movement   Contact: [phone]  I’m starting to wonder if I’m not feeling her move as much as I should be  I can go several hours and not feel her kick or move  I’m not sure if I’m over thinking it but wanted to ask what’s a good rule of thumb and when should I actually worry

## 2022-12-16 ENCOUNTER — OFFICE VISIT (OUTPATIENT)
Dept: PERINATAL CARE | Facility: CLINIC | Age: 22
End: 2022-12-16

## 2022-12-16 VITALS
HEART RATE: 118 BPM | SYSTOLIC BLOOD PRESSURE: 114 MMHG | HEIGHT: 64 IN | BODY MASS INDEX: 33.7 KG/M2 | WEIGHT: 197.4 LBS | DIASTOLIC BLOOD PRESSURE: 76 MMHG

## 2022-12-16 DIAGNOSIS — Z3A.28 28 WEEKS GESTATION OF PREGNANCY: ICD-10-CM

## 2022-12-16 DIAGNOSIS — O99.213 OBESITY COMPLICATING PREGNANCY, THIRD TRIMESTER: ICD-10-CM

## 2022-12-16 DIAGNOSIS — O24.410 DIET CONTROLLED GESTATIONAL DIABETES MELLITUS (GDM) IN THIRD TRIMESTER: Primary | ICD-10-CM

## 2022-12-16 NOTE — PROGRESS NOTES
Thank you for referring your patient to [facility] Maternal Fetal Medicine Diabetes in Pregnancy Program      Janay Teran is a  25 y o  female who presents today for Individual  Class 2  Patient is at 28w3d gestation, Estimated Date of Delivery: 3/7/23  Reviewed and updated the following from patients medical record: PMH, Problem List, Allergies, and Current Medications  Visit Diagnosis:  Diet controlled GDM    Additional Pregnancy Complications:  Obesity    Labs:    Lab Results   Component Value Date    [id number] 140 (H) 2022       Lab Results   Component Value Date    GLUF 98 (H) 2022        No components found for: HGA1C    Medications:  No diabetes related medications    Anthropometrics:  Ht Readings from Last 3 Encounters:   22 5' 4" (1 626 m)   22 5' 2" (1 575 m)   22 5' 2" (1 575 m)     Wt Readings from Last 3 Encounters:   22 89 5 kg (197 lb 6 4 oz)   22 90 7 kg (200 lb)   22 90 8 kg (200 lb 3 2 oz)     Pre-gravid weight: 88 kg (194 lb)  Pre-gravid BMI: 33 28  Weight Change: 1 542 kg (3 lb 6 4 oz)  Weight gain recommendations: BMI (> 30) 11-20 lbs  Comments: Patient may gain 16 more pounds for the remainder of the pregnancy  Recent Ultra Sound Results:  Date:10/18/22  Fetal Growth:Normal  ANASTACIA: Normal  Next US date: 23    Blood Glucose Monitoring:  Reinforcement of blood glucose goals and reporting guidelines  Glucose Meter: OneTouch Verio Flex  Testing blood sugars: 4 x per day (Fasting, 2 hour after start of each meal)  Method of reporting blood sugars:Glucose Flowsheet    Report blood sugar results weekly via:  Phone: [phone]   OR  My Chart (Message with image attachment, or Glucose Flowsheet)    Goal Blood Sugar Ranges:   Fastin-90 mg/dL  1 hour after the start of each meal: 140 mg/dL or less  2 hours after start of each meal: 120 mg/dL or less      BG Log:  Review of blood glucose log    Discussed at Class 2        Meal Plan:  Calories: 1800 calorie (CHO: 33-95-59-88-22-43) (PRO:2-1-3-1-3-2)    Diet Type: Low Carbohydrate  Additional Nutrition concerns: Dislikes: pork, red meat, & cottage cheese  24 hr Diet Recall:  Provided at this appointment    Diet review: Diet recall indicates she is following the meal plan closely  Has noticed brwon rice in small amounts increases her BG & will now avoid  C/O sweet tooth & uses grahm crackers in he correct amounts to satisfy her sweet tooth  Drinks mostly water, diet Dr Arlette Degroot & flavored water  Diet Instruction:  The patient was instructed on the followin  Individualized meal plan  2  Importance of consistent carbohydrate intake via 3 meals and 3 snacks per day   3  Importance of protein as it relates to blood glucose control  4  Encouraged  patient to eat every 2 0-3 5 hours while awake  5  Encouraged patient to go no longer than 8-10 hours fasting overnight until first meal of the day  6  Provided suggested meal/snack options to increase nutrition and maintain consistent meal and snack intakes  Physical Activity:  Discussed benefits of physical activity to optimize blood glucose control, encouraged activity at patient is physically able  Always consult a physician prior to starting an exercise program  Recommend 20-30 minutes daily  Is patient physically active? Yes Walks on her lunch break at work as has a [employer] job  Sick day Guidelines:   Patient advised that sickness will raise blood sugar and need to continue medication regimen as directed  If blood sugar is > 160 mg/dL twice in one day call doctor  Instructed on what to do when unable to consume normal meal plan  Hypoglycemia & Treatment Guidelines:  Reviewed what hypoglycemia is, signs and symptoms, and how to treat via the 15:15 rule      Post-Partum Guidelines:  Completion of 75 gm CHO 2 hr gtt at 6 weeks post-partum to check for Type 2 DM diagnosis    Breastfeeding Guidelines:  Continue GDM meal plan plus additional 350-500 calories daily  Stay hydrated by drinking 8-10 (8 oz ) fluids daily  Examples of protein and carbohydrate snacks provided  Dining Out & Travel Guidelines:  Patient advised to be prepared with extra diabetes supplies, medications, and snacks, as well as sticking to the same time schedule and portions eaten at home for meals and snacks  Maternal-Fetal Testing:    Ultrasounds- growth scans every 4 weeks  NST- twice weekly starting at 32nd week GA   ANASTACIA- weekly starting at 32 weeks GA    Patient Stated Goal: "I will check my blood sugar 4 times each day, as directed by diabetes and pregnancy team"  Goal Assessment: On track    Diabetes Self Management Support Plan outside of ongoing care: Spouse/Family    Learner/s Present:Learners Present: Patient   Barriers to Learning/Change: No Barriers  Expected Compliance: very good    Date to report blood sugars: Weekly  Follow up date: as needed    Begin Time: 8:25 AM  End Time: 9:25 AM    It was a pleasure working with them today  Please feel free to call with any questions or concerns      Yuri Cox  Diabetes Educator  [facility] Maternal Fetal Medicine  Diabetes in Pregnancy Program  [address] [place] [address], [address] [place] [address],[address]  [address]

## 2022-12-22 ENCOUNTER — ROUTINE PRENATAL (OUTPATIENT)
Dept: OBGYN CLINIC | Facility: CLINIC | Age: 22
End: 2022-12-22

## 2022-12-22 ENCOUNTER — DOCUMENTATION (OUTPATIENT)
Dept: PERINATAL CARE | Facility: CLINIC | Age: 22
End: 2022-12-22

## 2022-12-22 VITALS — SYSTOLIC BLOOD PRESSURE: 118 MMHG | BODY MASS INDEX: 33.75 KG/M2 | WEIGHT: 196.6 LBS | DIASTOLIC BLOOD PRESSURE: 70 MMHG

## 2022-12-22 DIAGNOSIS — Z3A.29 29 WEEKS GESTATION OF PREGNANCY: ICD-10-CM

## 2022-12-22 DIAGNOSIS — Z23 NEED FOR TDAP VACCINATION: ICD-10-CM

## 2022-12-22 DIAGNOSIS — O24.410 DIET CONTROLLED GESTATIONAL DIABETES MELLITUS (GDM) IN THIRD TRIMESTER: Primary | ICD-10-CM

## 2022-12-22 LAB
DME PARACHUTE DELIVERY DATE REQUESTED: NORMAL
DME PARACHUTE ITEM DESCRIPTION: NORMAL
DME PARACHUTE ORDER STATUS: NORMAL
DME PARACHUTE SUPPLIER NAME: NORMAL
DME PARACHUTE SUPPLIER PHONE: NORMAL

## 2022-12-22 NOTE — PROGRESS NOTES
Patient is a 26 YO [address] [facility] female presenting to the office at 29 2 weeks for routine OB care     BP: 118/70  TWlb  Fetal Movement: yes good movement  Feeling well today  Denies LOF, CTX, VB  Discussed third trimester teaching  Reviewed fetal kick counts, normal FM  Reviewed signs of PTL  Reviewed red folder, consents, birth plan  Delivery consent obtained   Breastfeeding: plans to  Breast Pump: ordered  TDAP: received today  FLU Vaccine: received  COVID Vaccine: recommend  28 Week Labs: failed glucola, A1GDM, following with DM JAYME  RTO 2 weeks for routine OB F/U

## 2022-12-22 NOTE — PROGRESS NOTES
Date: 12/22/22  Marguerite Covington  2000  Estimated Date of Delivery: 3/7/23  11U9K  OB/GYN:[facility]    Diagnosis:  Diet controlled GDM    Blood Sugar Logs Submitted via: Glucose Flowsheet      Assessment and Plan:  No diabetes related medications  1800 calorie (CHO: 67-84-08-34-00-89) (PRO:2-1-3-1-3-2) meal plan consisting of 3 meals and 3 snacks daily including protein at each  Advised patient to continue current meal plan  Avoid fasting for > 10 hours overnight  Continue SMBG 4 x per day (Fasting, 2 hour after start of each meal) with a One-Touch Verio glucose meter  Ish Memos on her lunch break at work 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  Lab Work:   No results found for: HGBA1C     Ultrasound:       Date:10/18/22       Fetal Growth: Normal       ANASTACIA: Normal   Next: 1/13/22    Diabetes Self Management Support Plan outside of ongoing care: Spouse/Family   Patient Stated Goal: "I will check my blood sugar 4 times each day, as directed by diabetes and pregnancy team"   Goal Assessment:  On track    Date to report next: Weekly     Xin Joseph MS, RD, CDE  Diabetes Educator  [facility] Maternal Fetal Medicine  Diabetes in Pregnancy Program  [address] [place] [address], [address] [place] [address],[address]  [address] 25.6

## 2022-12-22 NOTE — PROGRESS NOTES
Pt is well, has questions regarding delivery  Denies blood and fluid leakage  Pt will receive TDAP, Red folder, and breast pump   Urine dip test neg/neg

## 2022-12-30 ENCOUNTER — DOCUMENTATION (OUTPATIENT)
Dept: PERINATAL CARE | Facility: CLINIC | Age: 22
End: 2022-12-30

## 2022-12-30 NOTE — PROGRESS NOTES
Date: 12/30/22  Eliseo Leivn  2000  Estimated Date of Delivery: 3/7/23  42X3K  OB/GYN:[facility]    Diagnosis:  Diet controlled GDM    Blood Sugar Logs Submitted via: Glucose Flowsheet          Assessment and Plan:  No diabetes related medications  1800 calorie (CHO: 78-74-00-36-85-79) (PRO:2-1-3-1-3-2) meal plan consisting of 3 meals and 3 snacks daily including protein at each  Advised patient to continue current meal plan  Avoid fasting for > 10 hours overnight  Continue SMBG 4 x per day (Fasting, 2 hour after start of each meal) with a One-Touch Verio glucose meter  Thersia Cue on her lunch break at work 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 Maternal Fetal Medicine  Diabetes in Pregnancy Program  [address] [place] [address], [address] [place] [address],[address]  [address]

## 2023-01-05 ENCOUNTER — DOCUMENTATION (OUTPATIENT)
Dept: PERINATAL CARE | Facility: CLINIC | Age: 23
End: 2023-01-05

## 2023-01-05 NOTE — PROGRESS NOTES
Date: 01/05/23  Vazquez Romero  2000  Estimated Date of Delivery: 3/7/23  07B0C  OB/GYN:[facility]    Diagnosis:  Diet controlled GDM    Blood Sugar Logs Submitted via: Glucose Flowsheet      Assessment and Plan:  No diabetes related medications  1800 calorie (CHO: 59-30-12-24-59-88) (PRO:2-1-3-1-3-2) meal plan consisting of 3 meals and 3 snacks daily including protein at each  Advised patient to continue current meal plan  Avoid fasting for > 10 hours overnight  Continue SMBG 4 x per day (Fasting, 2 hour after start of each meal) with a One-Touch Verio glucose meter  Beverly [place] on her lunch break at work 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  Lab Work:   No results found for: HGBA1C     Ultrasound:       Date:10/18/22       Fetal Growth: Normal       ANASTACIA: Normal   Next: 1/13/22    Diabetes Self Management Support Plan outside of ongoing care: Spouse/Family   Patient Stated Goal: "I will check my blood sugar 4 times each day, as directed by diabetes and pregnancy team"   Goal Assessment:  On track    Date to report next: Weekly     Ronaldo Machado, MS, RD, CDE  Diabetes Educator  [facility] Maternal Fetal Medicine  Diabetes in Pregnancy Program  [facility], [address] [place] [address],[address]  [address]

## 2023-01-09 NOTE — PROGRESS NOTES
Please refer to the [facility] ultrasound report in Ob Procedures for additional information regarding today's visit

## 2023-01-11 ENCOUNTER — ROUTINE PRENATAL (OUTPATIENT)
Dept: OBGYN CLINIC | Facility: CLINIC | Age: 23
End: 2023-01-11

## 2023-01-11 ENCOUNTER — ULTRASOUND (OUTPATIENT)
Facility: HOSPITAL | Age: 23
End: 2023-01-11

## 2023-01-11 VITALS
DIASTOLIC BLOOD PRESSURE: 58 MMHG | HEART RATE: 97 BPM | HEIGHT: 64 IN | SYSTOLIC BLOOD PRESSURE: 110 MMHG | BODY MASS INDEX: 33.57 KG/M2 | WEIGHT: 196.6 LBS

## 2023-01-11 VITALS — BODY MASS INDEX: 33.81 KG/M2 | SYSTOLIC BLOOD PRESSURE: 104 MMHG | DIASTOLIC BLOOD PRESSURE: 60 MMHG | WEIGHT: 197 LBS

## 2023-01-11 DIAGNOSIS — O24.410 DIET CONTROLLED GESTATIONAL DIABETES MELLITUS (GDM) IN THIRD TRIMESTER: Primary | ICD-10-CM

## 2023-01-11 DIAGNOSIS — Z3A.32 32 WEEKS GESTATION OF PREGNANCY: ICD-10-CM

## 2023-01-11 NOTE — PROGRESS NOTES
Patient states she is feeling well, no complaints  Patient would like to talk about a note for work       Urine neg/neg

## 2023-01-11 NOTE — PROGRESS NOTES
25 y o   female at [address] [facility] (Estimated Date of Delivery: 3/7/23) for PNV      Pre-Pretty Vitals    Flowsheet Row Most Recent Value   Prenatal Assessment    Movement Present   Prenatal Vitals    Blood Pressure 104/60   Weight - Scale 89 4 kg (197 lb)   Urine Albumin/Glucose    Dilation/Effacement/Station    Vaginal Drainage    Edema          kg (3 lb)  A1GDM- following with diabetic ed  Growth u/s today, repeat at 36 weeks   Congested, discussed safe meds in pregnancy

## 2023-01-11 NOTE — PATIENT INSTRUCTIONS
Kick Counts in Pregnancy   WHAT YOU NEED TO KNOW:   Kick counts measure how much your baby is moving in your womb  A kick from your baby can be felt as a twist, turn, swish, roll, or jab  It is common to feel your baby kicking at 26 to 28 weeks of pregnancy  You may feel your baby kick as early as 20 weeks of pregnancy  You may want to start counting at 28 weeks  DISCHARGE INSTRUCTIONS:   Contact your doctor immediately if:   You feel a change in the number of kicks or movements of your baby  You feel fewer than 10 kicks within 2 hours  You have questions or concerns about your baby's movements  Why measure kick counts:  Your baby's movement may provide information about your baby's health  He or she may move less, or not at all, if there are problems  Your baby may move less if he or she is not getting enough oxygen or nutrition from the placenta  Do not smoke while you are pregnant  Smoking decreases the amount of oxygen that gets to your baby  Talk to your healthcare provider if you need help to quit smoking  Tell your healthcare provider as soon as you feel a change in your baby's movements  When to measure kick counts:   Measure kick counts at the same time every day  Measure kick counts when your baby is awake and most active  Your baby may be most active in the evening  How to measure kick counts:  Check that your baby is awake before you measure kick counts  You can wake up your baby by lightly pushing on your belly, walking, or drinking something cold  Your healthcare provider may tell you different ways to measure kick counts  You may be told to do the following:  Use a chart or clock to keep track of the time you start and finish counting  Sit in a chair or lie on your left side  Place your hands on the largest part of your belly  Count until you reach 10 kicks  Write down how much time it takes to count 10 kicks  It may take 30 minutes to 2 hours to count 10 kicks  It should not take more than 2 hours to count 10 kicks  Follow up with your doctor as directed:  Write down your questions so you remember to ask them during your visits  © Copyright [employer] 2022 Information is for End User's use only and may not be sold, redistributed or otherwise used for commercial purposes  All illustrations and images included in CareNotes® are the copyrighted property of A D A M , Inc  or [facility] [place]note 2] Thanh Anderson   The above information is an  only  It is not intended as medical advice for individual conditions or treatments  Talk to your doctor, nurse or pharmacist before following any medical regimen to see if it is safe and effective for you

## 2023-01-11 NOTE — LETTER
January 11, 2023     Kaitlyn Marrero Pi, MD [address]  [facility]    Patient: Aaron Younger   YOB: 2000   Date of Visit: 1/11/2023       Dear Dr Colt Ramos: Thank you for referring Ji Nicholson to me for evaluation  Below are my notes for this consultation  If you have questions, please do not hesitate to call me  I look forward to following your patient along with you           Sincerely,        Catherine Todd MD        CC: No Recipients  Catherine Todd MD  1/9/2023 10:33 AM  Sign when Signing Visit  Please refer to the [facility] ultrasound report in Ob Procedures for additional information regarding today's visit

## 2023-01-16 ENCOUNTER — DOCUMENTATION (OUTPATIENT)
Dept: PERINATAL CARE | Facility: CLINIC | Age: 23
End: 2023-01-16

## 2023-01-16 NOTE — PROGRESS NOTES
Date: 01/16/23  Johann Dasilva  2000  Estimated Date of Delivery: 3/7/23  [id number]  OB/GYN:[facility]    Diagnosis:  Diet controlled GDM    Blood Sugar Logs Submitted via: Glucose Flowsheet              1/15/23 had increased BG due to her baby shower per patient      Assessment and Plan:  No diabetes related medications  1800 calorie (CHO: 90-36-49-58-86-59) (PRO:2-1-3-1-3-2) meal plan consisting of 3 meals and 3 snacks daily including protein at each  Advised patient to continue current meal plan  Avoid fasting for > 10 hours overnight  Continue SMBG 4 x per day (Fasting, 2 hour after start of each meal) with a One-Touch Verio glucose meter  Kisha Sit on her lunch break at work 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

## 2023-01-23 ENCOUNTER — ROUTINE PRENATAL (OUTPATIENT)
Dept: OBGYN CLINIC | Facility: CLINIC | Age: 23
End: 2023-01-23

## 2023-01-23 VITALS — DIASTOLIC BLOOD PRESSURE: 80 MMHG | SYSTOLIC BLOOD PRESSURE: 110 MMHG | WEIGHT: 197.6 LBS | BODY MASS INDEX: 33.92 KG/M2

## 2023-01-23 DIAGNOSIS — Z3A.33 33 WEEKS GESTATION OF PREGNANCY: ICD-10-CM

## 2023-01-23 DIAGNOSIS — Z34.83 ENCOUNTER FOR SUPERVISION OF OTHER NORMAL PREGNANCY, THIRD TRIMESTER: ICD-10-CM

## 2023-01-23 DIAGNOSIS — O24.410 DIET CONTROLLED GESTATIONAL DIABETES MELLITUS (GDM) IN THIRD TRIMESTER: Primary | ICD-10-CM

## 2023-01-23 NOTE — PROGRESS NOTES
Pt is here for a routine prenatal, 33 weeks  Pt is well, no concerns  Denies vaginal bleeding and leakage of fluid  Urine dip test neg protein/ neg glucose

## 2023-01-23 NOTE — PROGRESS NOTES
This is a 25 y o   at 33w6d who presents for return OB visit  No complaints  Denies contractions, leakage, bleeding  Endorses fetal movement   BP: 110/80 TWG: 3lb    GDMA1: continue with DM Edu  Reviewed birth plan - pt brought her own  No specific requests outside of the standard of care  Scanned into chart and pt has copy     32 wk EFW 4#9 (67%), repeat 36 wks  F/up 2 wk

## 2023-01-26 ENCOUNTER — PATIENT MESSAGE (OUTPATIENT)
Dept: OBGYN CLINIC | Facility: CLINIC | Age: 23
End: 2023-01-26

## 2023-01-26 ENCOUNTER — DOCUMENTATION (OUTPATIENT)
Dept: PERINATAL CARE | Facility: CLINIC | Age: 23
End: 2023-01-26

## 2023-01-26 NOTE — PROGRESS NOTES
Date: 01/26/23  Carol Pang  2000  Estimated Date of Delivery: 3/7/23  [id number]  OB/GYN:Warholic    Diagnosis:  Diet controlled GDM    Blood Sugar Logs Submitted via: Glucose Flowsheet              1/15/23 had increased BG due to her baby shower per patient      Assessment and Plan:  No diabetes related medications  1800 calorie (CHO: 94-88-91-63-73-29) (PRO:2-1-3-1-3-2) meal plan consisting of 3 meals and 3 snacks daily including protein at each  Advised patient to continue current meal plan  Avoid fasting for > 10 hours overnight  Continue SMBG 4 x per day (Fasting, 2 hour after start of each meal) with a One-Touch Verio glucose meter  Uriah Giancarlo on her lunch break at work 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  Lab Work:   No results found for: HGBA1C     Ultrasound:       Date:1/11/23       Fetal Growth: Normal       ANASTACIA: Normal   Next: 2/7/23    Diabetes Self Management Support Plan outside of ongoing care: Spouse/Family   Patient Stated Goal: "I will check my blood sugar 4 times each day, as directed by diabetes and pregnancy team"   Goal Assessment:  On track    Date to report next: Weekly     Mara Hensley, MS, RD, CDE  Diabetes Educator  [facility] Maternal Fetal Medicine  Diabetes in Pregnancy Program  [address] [place] [address],[address]  [address]

## 2023-02-01 ENCOUNTER — PATIENT MESSAGE (OUTPATIENT)
Dept: PERINATAL CARE | Facility: CLINIC | Age: 23
End: 2023-02-01

## 2023-02-01 ENCOUNTER — DOCUMENTATION (OUTPATIENT)
Dept: PERINATAL CARE | Facility: CLINIC | Age: 23
End: 2023-02-01

## 2023-02-01 NOTE — PROGRESS NOTES
Date: 02/01/23  José Amaro  2000  Estimated Date of Delivery: 3/7/23  44I1K  OB/GYN:War[facility]olic    Diagnosis:  Diet controlled GDM    Blood Sugar Logs Submitted via: Glucose Flowsheet        Assessment and Plan:  No diabetes related medications  1800 calorie (CHO: 63-18-59-78-11-86) (PRO:2-1-3-1-3-2) meal plan consisting of 3 meals and 3 snacks daily including protein at each  Advised patient to continue current meal plan  Avoid fasting for > 10 hours overnight  Continue SMBG 4 x per day (Fasting, 2 hour after start of each meal) with a One-Touch Verio glucose meter  Ludie Phoenix on her lunch break at work 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  Lab Work:   No results found for: HGBA1C     Ultrasound:       Date:1/11/23       Fetal Growth: Normal       ANASTACIA: Normal   Next: 2/7/23    Diabetes Self Management Support Plan outside of ongoing care: Spouse/Family   Patient Stated Goal: "I will check my blood sugar 4 times each day, as directed by diabetes and pregnancy team"   Goal Assessment:  On track    Date to report next: Weekly     Genaro Molina, MS, RD, CDE  Diabetes Educator  [facility] Maternal Fetal Medicine  Diabetes in Pregnancy Program  [address] [place] [address], [address] [place] [address],[address]  [address]

## 2023-02-06 ENCOUNTER — ROUTINE PRENATAL (OUTPATIENT)
Dept: OBGYN CLINIC | Facility: CLINIC | Age: 23
End: 2023-02-06

## 2023-02-06 VITALS — SYSTOLIC BLOOD PRESSURE: 106 MMHG | WEIGHT: 198 LBS | DIASTOLIC BLOOD PRESSURE: 62 MMHG | BODY MASS INDEX: 33.99 KG/M2

## 2023-02-06 DIAGNOSIS — O24.410 DIET CONTROLLED GESTATIONAL DIABETES MELLITUS (GDM) IN THIRD TRIMESTER: ICD-10-CM

## 2023-02-06 DIAGNOSIS — Z3A.35 35 WEEKS GESTATION OF PREGNANCY: Primary | ICD-10-CM

## 2023-02-06 DIAGNOSIS — B00.9 HERPES INFECTION: ICD-10-CM

## 2023-02-06 RX ORDER — VALACYCLOVIR HYDROCHLORIDE 500 MG/1
500 TABLET, FILM COATED ORAL DAILY
Qty: 30 TABLET | Refills: 3 | Status: SHIPPED | OUTPATIENT
Start: 2023-02-06 | End: 2023-03-08

## 2023-02-06 NOTE — PATIENT INSTRUCTIONS
Please refer to Rebeca Sloan Pregnancy Essentials Guide  [facility] ([facility])  to access our pregnancy essentials reference guide  Here you will find a lot of information for all trimesters of pregnancy as well as postpartum  You will be able to access information about medications that are safe to use during pregnancy, warning signs in third trimester, what to pack for your hospital stay and many other useful guides  There is also information on class available through our [address] and Pregnancy Classes  [facility] ([facility])  Please do not hesitate to contact the office through [address] or by calling for [phone] with any questions or concerns  We look forward to seeing you at your next scheduled visit!

## 2023-02-06 NOTE — PROGRESS NOTES
21 y o   female at 26w5d (Estimated Date of Delivery: 3/7/23) for PNV  Pre- Vitals    Flowsheet Row Most Recent Value   Prenatal Assessment    Fetal Heart Rate 130   Movement Present   Presentation Vertex   Prenatal Vitals    Blood Pressure 106/62   Weight - Scale 89 8 kg (198 lb)   Urine Albumin/Glucose    Dilation/Effacement/Station    Cervical Dilation 0   Cervical Effacement 0   Fetal Station -3   Vaginal Drainage    Edema          kg (4 lb)    Leakage of fluid: yes  Vaginal bleeding: no  Contractions/Cramping: BH contractions, occasional sharp pain on the left  Fetal movement: yes    GBS done: Yes  PCN allergy: No  Chlamydia/gonorrhea swab done: Yes  Delivery plan: consider IOL by 40 wks due to GDM  Growth US tomorrow  Sugars well controlled  Discussed IOL by [facility], or anytime after 39 wks  Labor precautions given  [address] reviewed  RTO in 1 weeks

## 2023-02-07 LAB
C TRACH DNA SPEC QL NAA+PROBE: NEGATIVE
N GONORRHOEA DNA SPEC QL NAA+PROBE: NEGATIVE

## 2023-02-08 LAB — GP B STREP DNA SPEC QL NAA+PROBE: NEGATIVE

## 2023-02-08 NOTE — RESULT ENCOUNTER NOTE
Divya Bautista Sers    Your GBS swab is negative, therefore you do not need antibiotics in labor  Please contact the office with any questions     Amber

## 2023-02-09 ENCOUNTER — DOCUMENTATION (OUTPATIENT)
Dept: PERINATAL CARE | Facility: CLINIC | Age: 23
End: 2023-02-09

## 2023-02-09 ENCOUNTER — ULTRASOUND (OUTPATIENT)
Dept: PERINATAL CARE | Facility: OTHER | Age: 23
End: 2023-02-09

## 2023-02-09 VITALS
HEIGHT: 62 IN | BODY MASS INDEX: 36.84 KG/M2 | WEIGHT: 200.2 LBS | DIASTOLIC BLOOD PRESSURE: 70 MMHG | HEART RATE: 92 BPM | SYSTOLIC BLOOD PRESSURE: 110 MMHG

## 2023-02-09 DIAGNOSIS — O99.213 OBESITY AFFECTING PREGNANCY IN THIRD TRIMESTER: ICD-10-CM

## 2023-02-09 DIAGNOSIS — Z36.89 ENCOUNTER FOR ULTRASOUND TO CHECK FETAL GROWTH: ICD-10-CM

## 2023-02-09 DIAGNOSIS — O24.410 DIET CONTROLLED GESTATIONAL DIABETES MELLITUS (GDM) IN THIRD TRIMESTER: Primary | ICD-10-CM

## 2023-02-09 NOTE — LETTER
Date: 2023    Justin Pena, EVAN  [address]   [address]  [place] [address]    Patient: Sri Lema   YOB: 2000   Date of Visit: 2023   Gestational age Vidal Decker of this communication: Routine though please note she shoudl be in weekly testing 37 weeks+ for BMI  Dear Swapnil Fearing,    This patient was seen recently in our  office  Please see ultrasound report under "OB Procedures" tab  Please don't hesitate to contact our office with any concerns or questions        Sincerely,      Jim Ward MD  Attending Physician, Manoh[facility]

## 2023-02-09 NOTE — PROGRESS NOTES
Date: 02/09/23  Kassidy Ask  2000  Estimated Date of Delivery: 3/7/23  [id number]  OB/GYN:Warholic    Diagnosis:  Diet controlled GDM    Blood Sugar Logs Submitted via: Glucose Flowsheet      Reports she is moving the past few days & has not been able to test her BG  Assessment and Plan:  No diabetes related medications  1800 calorie (CHO: 75-82-18-50-35-82) (PRO:2-1-3-1-3-2) meal plan consisting of 3 meals and 3 snacks daily including protein at each  Advised patient to continue current meal plan  Avoid fasting for > 10 hours overnight  Continue SMBG 4 x per day (Fasting, 2 hour after start of each meal) with a One-Touch Verio glucose meter  Ness Balbuena on her lunch break at work 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  Lab Work:   No results found for: HGBA1C     Ultrasound:       Date:2/9/23       Fetal Growth: Normal       ANASTACIA: Normal   Next: None    Diabetes Self Management Support Plan outside of ongoing care: Spouse/Family   Patient Stated Goal: "I will check my blood sugar 4 times each day, as directed by diabetes and pregnancy team"   Goal Assessment:  On track    Date to report next: Weekly     Shannon Chapman MS, RD, CDE  Diabetes Educator  [facility] Maternal Fetal Medicine  Diabetes in Pregnancy Program  [address] [place] [address],[address]  [address]

## 2023-02-09 NOTE — PROGRESS NOTES
Lion Blanca: Ms Coy Ontiveros was seen today for fetal growth assessment ultrasound  See ultrasound report under "OB Procedures" tab  The time spent on this established patient on the encounter date included 5 minutes previsit service time reviewing records and precharting, 5 minutes face-to-face service time counseling regarding results and coordinating care, and  4 minutes charting, totalling 14 minutes  Please don't hesitate to contact our office with any concerns or questions    Leslye Hurst MD

## 2023-02-09 NOTE — PATIENT INSTRUCTIONS
Thank you for choosing us for your  care today  If you have any questions about your ultrasound or care, please do not hesitate to contact us or your primary obstetrician  Some general instructions for your pregnancy are:    Exercise: Aim for 22 minutes per day (150 minutes per week) of regular exercise  Walking is great! Nutrition: Choose healthy sources of calcium, iron, and protein  Learn about Preeclampsia: preeclampsia is a common, serious high blood pressure complication in pregnancy  A blood pressure of 988OWCL (systolic or top number) or 50BWMP (diastolic or bottom number) is not normal and needs evaluation by your doctor  Aspirin is sometimes prescribed in early pregnancy to prevent preeclampsia in women with risk factors - ask your obstetrician if you should be on this medication  For more resources, visit:  [employer] fi  If you smoke, try to reduce how many cigarettes you smoke or try to quit completely  Do not vape  Other warning signs to watch out for in pregnancy or postpartum: chest pain, obstructed breathing or shortness of breath, seizures, thoughts of hurting yourself or your baby, bleeding, a painful or swollen leg, fever, or headache (see [facility] POST-BIRTH Warning Signs campaign)  If these happen call 911  Itching is also not normal in pregnancy and if you experience this, especially over your hands and feet, potentially worse at night, notify your doctors

## 2023-02-15 ENCOUNTER — ROUTINE PRENATAL (OUTPATIENT)
Dept: OBGYN CLINIC | Facility: CLINIC | Age: 23
End: 2023-02-15

## 2023-02-15 VITALS — SYSTOLIC BLOOD PRESSURE: 118 MMHG | BODY MASS INDEX: 36.21 KG/M2 | WEIGHT: 198 LBS | DIASTOLIC BLOOD PRESSURE: 66 MMHG

## 2023-02-15 DIAGNOSIS — O24.410 DIET CONTROLLED GESTATIONAL DIABETES MELLITUS (GDM) IN THIRD TRIMESTER: Primary | ICD-10-CM

## 2023-02-15 DIAGNOSIS — Z3A.37 37 WEEKS GESTATION OF PREGNANCY: ICD-10-CM

## 2023-02-15 NOTE — PROGRESS NOTES
21 y o   female at 37w1d (Estimated Date of Delivery: 3/7/23) for PNV      Pre- Vitals    Flowsheet Row Most Recent Value   Prenatal Assessment    Movement Present   Prenatal Vitals    Blood Pressure 118/66   Weight - Scale 89 8 kg (198 lb)   Urine Albumin/Glucose    Dilation/Effacement/Station    Vaginal Drainage    Edema          kg (4 lb)  NST is reactive, cat I  A1GDM  Reviewed labor precautions

## 2023-02-16 ENCOUNTER — DOCUMENTATION (OUTPATIENT)
Dept: PERINATAL CARE | Facility: CLINIC | Age: 23
End: 2023-02-16

## 2023-02-16 NOTE — PROGRESS NOTES
Date: 02/16/23  Bernard Cohen  2000  Estimated Date of Delivery: 3/7/23  [id number]  OB/GYN:[facility]    Diagnosis:  Diet controlled GDM    Blood Sugar Logs Submitted via: Glucose Flowsheet        Assessment and Plan:  No diabetes related medications  1800 calorie (CHO: 33-20-64-55-17-19) (PRO:2-1-3-1-3-2) meal plan consisting of 3 meals and 3 snacks daily including protein at each  Advised patient to continue current meal plan  Avoid fasting for > 10 hours overnight  Continue SMBG 4 x per day (Fasting, 2 hour after start of each meal) with a One-Touch Verio glucose meter  Sterlingpankaj Clay on her lunch break at work 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  Lab Work:   No results found for: HGBA1C     Ultrasound:       Date:2/9/23       Fetal Growth: Normal       ANASTACIA: Normal   Next: None    Diabetes Self Management Support Plan outside of ongoing care: Spouse/Family   Patient Stated Goal: "I will check my blood sugar 4 times each day, as directed by diabetes and pregnancy team"   Goal Assessment:  On track    Date to report next: Weekly     Joshua Post, MS, RD, CDE  Diabetes Educator  [facility] Maternal Fetal Medicine  Diabetes in Pregnancy Program  [address] [facility], [address] [place] [address],[address]  [address]

## 2023-02-23 ENCOUNTER — DOCUMENTATION (OUTPATIENT)
Dept: PERINATAL CARE | Facility: CLINIC | Age: 23
End: 2023-02-23

## 2023-02-23 NOTE — PROGRESS NOTES
Date: 02/23/23  Amandeep Rush  2000  Estimated Date of Delivery: 3/7/23  [id number]  OB/GYN:[facility]    Diagnosis:  Diet controlled GDM    Blood Sugar Logs Submitted via: Glucose Flowsheet        Assessment and Plan:  No diabetes related medications  1800 calorie (CHO: 07-41-16-75-70-06) (PRO:2-1-3-1-3-2) meal plan consisting of 3 meals and 3 snacks daily including protein at each  Advised patient to continue current meal plan  Avoid fasting for > 10 hours overnight  Continue SMBG 4 x per day (Fasting, 2 hour after start of each meal) with a One-Touch Verio glucose meter  Nery Shuck on her lunch break at work 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  Lab Work:   No results found for: HGBA1C     Ultrasound:       Date:2/9/23       Fetal Growth: Normal       ANASTACIA: Normal   Next: None    Diabetes Self Management Support Plan outside of ongoing care: Spouse/Family   Patient Stated Goal: "I will check my blood sugar 4 times each day, as directed by diabetes and pregnancy team"   Goal Assessment:  On track    Date to report next: Weekly     Presley Fung, MS, RD, CDE  Diabetes Educator  [facility] Maternal Fetal Medicine  Diabetes in Pregnancy Program  [address] [place] [address], [address] [place] [address],[address]  [address]

## 2023-02-24 ENCOUNTER — ROUTINE PRENATAL (OUTPATIENT)
Dept: OBGYN CLINIC | Facility: CLINIC | Age: 23
End: 2023-02-24

## 2023-02-24 VITALS — WEIGHT: 199 LBS | SYSTOLIC BLOOD PRESSURE: 116 MMHG | BODY MASS INDEX: 36.4 KG/M2 | DIASTOLIC BLOOD PRESSURE: 78 MMHG

## 2023-02-24 DIAGNOSIS — O24.410 DIET CONTROLLED GESTATIONAL DIABETES MELLITUS (GDM) IN THIRD TRIMESTER: ICD-10-CM

## 2023-02-24 DIAGNOSIS — Z3A.38 38 WEEKS GESTATION OF PREGNANCY: Primary | ICD-10-CM

## 2023-02-24 DIAGNOSIS — O99.213 OBESITY AFFECTING PREGNANCY IN THIRD TRIMESTER: ICD-10-CM

## 2023-02-24 NOTE — PROGRESS NOTES
Patient states that her right hand has been painful and going numb lately  She also has been been experiencing some mild cramping but denies any fluid leaking  Urine dip neg/neg

## 2023-02-24 NOTE — PATIENT INSTRUCTIONS
Kick Counts in Pregnancy   WHAT YOU NEED TO KNOW:   Kick counts measure how much your baby is moving in your womb  A kick from your baby can be felt as a twist, turn, swish, roll, or jab  It is common to feel your baby kicking at 26 to 28 weeks of pregnancy  You may feel your baby kick as early as 20 weeks of pregnancy  You may want to start counting at 28 weeks  DISCHARGE INSTRUCTIONS:   Contact your doctor immediately if:   You feel a change in the number of kicks or movements of your baby  You feel fewer than 10 kicks within 2 hours  You have questions or concerns about your baby's movements  Why measure kick counts:  Your baby's movement may provide information about your baby's health  He or she may move less, or not at all, if there are problems  Your baby may move less if he or she is not getting enough oxygen or nutrition from the placenta  Do not smoke while you are pregnant  Smoking decreases the amount of oxygen that gets to your baby  Talk to your healthcare provider if you need help to quit smoking  Tell your healthcare provider as soon as you feel a change in your baby's movements  When to measure kick counts:   Measure kick counts at the same time every day  Measure kick counts when your baby is awake and most active  Your baby may be most active in the evening  How to measure kick counts:  Check that your baby is awake before you measure kick counts  You can wake up your baby by lightly pushing on your belly, walking, or drinking something cold  Your healthcare provider may tell you different ways to measure kick counts  You may be told to do the following:  Use a chart or clock to keep track of the time you start and finish counting  Sit in a chair or lie on your left side  Place your hands on the largest part of your belly  Count until you reach 10 kicks  Write down how much time it takes to count 10 kicks  It may take 30 minutes to 2 hours to count 10 kicks  It should not take more than 2 hours to count 10 kicks  Follow up with your doctor as directed:  Write down your questions so you remember to ask them during your visits  © Copyright [employer] 2022 Information is for End User's use only and may not be sold, redistributed or otherwise used for commercial purposes  The above information is an  only  It is not intended as medical advice for individual conditions or treatments  Talk to your doctor, nurse or pharmacist before following any medical regimen to see if it is safe and effective for you

## 2023-02-24 NOTE — PROGRESS NOTES
21 y o   female at 38w3d (Estimated Date of Delivery: 3/7/23) for PNV  Pre- Vitals    Flowsheet Row Most Recent Value   Prenatal Assessment    Fetal Heart Rate 130   Movement Present   Prenatal Vitals    Blood Pressure 116/78   Weight - Scale 90 3 kg (199 lb)   Urine Albumin/Glucose    Dilation/Effacement/Station    Vaginal Drainage    Edema          kg (5 lb)    Leakage of fluid: no  Vaginal bleeding: no  Contractions/Cramping: yes  Fetal movement: yes -- patient reports that she is still meeting [facility] however it is taking longer to meet them  She has noticed a decrease in FM and more subtle movements  LVP 2 0cm and reactive NST today  - discussed closely monitoring [facility] and if she does not meet [facility], need for evaluation on L&D  Counseled that if she is not meeting [facility] at any time after 39 weeks, delivery will be recommended via IOL if not in labor  GDM - diet controlled  Following with diabetic education     - NST performed today  Reactive and reassuring     - sugars well controlled  Discussed delivery ideally prior to BRUNO  Discussed risks related to IOL and reviewed procedures for IOL including cervical ripening with huffman balloon and misoprostol  Patient expressed understanding of all that was discussed  RTO in 1 weeks

## 2023-03-01 ENCOUNTER — ROUTINE PRENATAL (OUTPATIENT)
Dept: OBGYN CLINIC | Facility: CLINIC | Age: 23
End: 2023-03-01

## 2023-03-01 VITALS — WEIGHT: 198 LBS | DIASTOLIC BLOOD PRESSURE: 68 MMHG | SYSTOLIC BLOOD PRESSURE: 118 MMHG | BODY MASS INDEX: 36.21 KG/M2

## 2023-03-01 DIAGNOSIS — O24.410 DIET CONTROLLED GESTATIONAL DIABETES MELLITUS (GDM) IN THIRD TRIMESTER: Primary | ICD-10-CM

## 2023-03-01 DIAGNOSIS — Z3A.39 39 WEEKS GESTATION OF PREGNANCY: ICD-10-CM

## 2023-03-01 PROBLEM — Z34.83 ENCOUNTER FOR SUPERVISION OF OTHER NORMAL PREGNANCY, THIRD TRIMESTER: Status: RESOLVED | Noted: 2022-12-02 | Resolved: 2023-03-01

## 2023-03-01 NOTE — PROGRESS NOTES
This is a 21 y o   at 36w3d who presents for return OB visit  No complaints  Denies contractions, leakage, bleeding  Endorses fetal movement   BP: 118/68 TWlb    SVE: /50/-3  Scheduled for IOL on 3/6 (39+6)   Reviewed expectations, cytotec, huffman, pitocin, AROM, typical timeframe  F/up postpartum

## 2023-03-01 NOTE — PROGRESS NOTES
Pt is here for her 39w prenatal  Pt has swelling in her hands feet and face and pt has pressure  Pt denies, leakage and bleeding  Pt states she has some cramping   Pt would like cervical exam

## 2023-03-03 ENCOUNTER — DOCUMENTATION (OUTPATIENT)
Dept: PERINATAL CARE | Facility: CLINIC | Age: 23
End: 2023-03-03

## 2023-03-03 NOTE — PROGRESS NOTES
Date: 03/03/23  Alfredito Filter  2000  Estimated Date of Delivery: 3/7/23  [id number]  OB/GYN:[facility]    Diagnosis:  Diet controlled GDM    Blood Sugar Logs Submitted via: Glucose Flowsheet        Assessment and Plan:  Induction scheduled for Monday, 3/6/23  No diabetes related medications  1800 calorie (CHO: 91-25-33-57-13-77) (PRO:2-1-3-1-3-2) meal plan consisting of 3 meals and 3 snacks daily including protein at each  Advised patient to continue current meal plan  Avoid fasting for > 10 hours overnight  Continue SMBG 4 x per day (Fasting, 2 hour after start of each meal) with a One-Touch Verio glucose meter  Josy Alcantara on her lunch break at work 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  Lab Work:   No results found for: HGBA1C     Ultrasound:       Date:2/9/23       Fetal Growth: Normal       ANASTACIA: Normal   Next: None    Diabetes Self Management Support Plan outside of ongoing care: Spouse/Family   Patient Stated Goal: "I will check my blood sugar 4 times each day, as directed by diabetes and pregnancy team"   Goal Assessment:  On track    Date to report next: None     Rodney Meade, MS, RD, CDE  Diabetes Educator  [facility] Maternal Fetal Medicine  Diabetes in Pregnancy Program  [address] [place] [address],[address]  [address]

## 2023-03-06 ENCOUNTER — HOSPITAL ENCOUNTER (INPATIENT)
Facility: HOSPITAL | Age: 23
LOS: 3 days | Discharge: HOME | End: 2023-03-09
Attending: OBSTETRICS & GYNECOLOGY | Admitting: OBSTETRICS & GYNECOLOGY

## 2023-03-06 ENCOUNTER — ANESTHESIA EVENT (INPATIENT)
Dept: LABOR AND DELIVERY | Facility: HOSPITAL | Age: 23
End: 2023-03-06

## 2023-03-06 ENCOUNTER — ANESTHESIA (INPATIENT)
Dept: LABOR AND DELIVERY | Facility: HOSPITAL | Age: 23
End: 2023-03-06

## 2023-03-06 ENCOUNTER — HOSPITAL ENCOUNTER (OUTPATIENT)
Dept: LABOR AND DELIVERY | Facility: HOSPITAL | Age: 23
Discharge: HOME | End: 2023-03-06

## 2023-03-06 DIAGNOSIS — Z98.891 STATUS POST PRIMARY LOW TRANSVERSE CESAREAN SECTION: ICD-10-CM

## 2023-03-06 DIAGNOSIS — O36.8390 NON-REASSURING FETAL HEART TONES COMPLICATING PREGNANCY, ANTEPARTUM: ICD-10-CM

## 2023-03-06 DIAGNOSIS — Z3A.39 39 WEEKS GESTATION OF PREGNANCY: Primary | ICD-10-CM

## 2023-03-06 LAB
ABO GROUP BLD: NORMAL
BLD GP AB SCN SERPL QL: NEGATIVE
ERYTHROCYTE [DISTWIDTH] IN BLOOD BY AUTOMATED COUNT: 14.3 % (ref 11.6–15.1)
GLUCOSE SERPL-MCNC: 76 MG/DL (ref 65–140)
GLUCOSE SERPL-MCNC: 76 MG/DL (ref 65–140)
GLUCOSE SERPL-MCNC: 89 MG/DL (ref 65–140)
GLUCOSE SERPL-MCNC: 89 MG/DL (ref 65–140)
HCT VFR BLD AUTO: 34.9 % (ref 34.8–46.1)
HGB BLD-MCNC: 11.6 G/DL (ref 11.5–15.4)
HOLD SPECIMEN: NORMAL
MCH RBC QN AUTO: 28.1 PG (ref 26.8–34.3)
MCHC RBC AUTO-ENTMCNC: 33.2 G/DL (ref 31.4–37.4)
MCV RBC AUTO: 85 FL (ref 82–98)
PLATELET # BLD AUTO: 261 THOUSANDS/UL (ref 149–390)
PMV BLD AUTO: 9.8 FL (ref 8.9–12.7)
RBC # BLD AUTO: 4.13 MILLION/UL (ref 3.81–5.12)
RH BLD: POSITIVE
SPECIMEN EXPIRATION DATE: NORMAL
TREPONEMA PALLIDUM IGG+IGM AB [PRESENCE] IN SERUM OR PLASMA BY IMMUNOASSAY: NORMAL
WBC # BLD AUTO: 8.88 THOUSAND/UL (ref 4.31–10.16)

## 2023-03-06 PROCEDURE — 3E0P7VZ INTRODUCTION OF HORMONE INTO FEMALE REPRODUCTIVE, VIA NATURAL OR ARTIFICIAL OPENING: ICD-10-PCS | Performed by: OBSTETRICS & GYNECOLOGY

## 2023-03-06 PROCEDURE — 4A1HXCZ MONITORING OF PRODUCTS OF CONCEPTION, CARDIAC RATE, EXTERNAL APPROACH: ICD-10-PCS | Performed by: OBSTETRICS & GYNECOLOGY

## 2023-03-06 PROCEDURE — 3E033VJ INTRODUCTION OF OTHER HORMONE INTO PERIPHERAL VEIN, PERCUTANEOUS APPROACH: ICD-10-PCS | Performed by: OBSTETRICS & GYNECOLOGY

## 2023-03-06 RX ORDER — BUPIVACAINE HYDROCHLORIDE 2.5 MG/ML
30 INJECTION, SOLUTION EPIDURAL; INFILTRATION; INTRACAUDAL ONCE AS NEEDED
Status: DISCONTINUED | OUTPATIENT
Start: 2023-03-06 | End: 2023-03-07

## 2023-03-06 RX ORDER — ACETAMINOPHEN 325 MG/1
650 TABLET ORAL EVERY 6 HOURS PRN
Status: DISCONTINUED | OUTPATIENT
Start: 2023-03-06 | End: 2023-03-07

## 2023-03-06 RX ORDER — ONDANSETRON 2 MG/ML
4 INJECTION INTRAMUSCULAR; INTRAVENOUS EVERY 4 HOURS PRN
Status: DISCONTINUED | OUTPATIENT
Start: 2023-03-06 | End: 2023-03-07

## 2023-03-06 RX ORDER — OXYTOCIN/RINGER'S LACTATE 30/500 ML
1-30 PLASTIC BAG, INJECTION (ML) INTRAVENOUS
Status: DISCONTINUED | OUTPATIENT
Start: 2023-03-06 | End: 2023-03-07

## 2023-03-06 RX ORDER — SODIUM CHLORIDE 9 MG/ML
125 INJECTION, SOLUTION INTRAVENOUS CONTINUOUS
Status: DISCONTINUED | OUTPATIENT
Start: 2023-03-06 | End: 2023-03-07

## 2023-03-06 RX ORDER — CALCIUM CARBONATE 200(500)MG
1000 TABLET,CHEWABLE ORAL 3 TIMES DAILY PRN
Status: DISCONTINUED | OUTPATIENT
Start: 2023-03-06 | End: 2023-03-09 | Stop reason: HOSPADM

## 2023-03-06 RX ADMIN — SODIUM CHLORIDE 125 ML/HR: 0.9 INJECTION, SOLUTION INTRAVENOUS at 16:01

## 2023-03-06 RX ADMIN — Medication 2 MILLI-UNITS/MIN: at 22:28

## 2023-03-06 RX ADMIN — SODIUM CHLORIDE 125 ML/HR: 0.9 INJECTION, SOLUTION INTRAVENOUS at 23:25

## 2023-03-06 RX ADMIN — Medication 25 MCG: at 16:52

## 2023-03-06 RX ADMIN — ONDANSETRON 4 MG: 2 INJECTION INTRAMUSCULAR; INTRAVENOUS at 23:23

## 2023-03-06 NOTE — PLAN OF CARE
Problem: Knowledge Deficit  Goal: Verbalizes understanding of labor plan  Description: Assess patient/family/caregiver's baseline knowledge level and ability to understand information  Provide education via patient/family/caregiver's preferred learning method at appropriate level of understanding  1  Provide teaching at level of understanding  2  Provide teaching via preferred learning method(s)  Outcome: Progressing  Goal: Patient/family/caregiver demonstrates understanding of disease process, treatment plan, medications, and discharge instructions  Description: Complete learning assessment and assess knowledge base  Interventions:  - Provide teaching at level of understanding  - Provide teaching via preferred learning methods  Outcome: Progressing     Problem: Labor & Delivery  Goal: Manages discomfort  Description: Assess and monitor for signs and symptoms of discomfort  Assess patient's pain level regularly and per hospital policy  Administer medications as ordered  Support use of nonpharmacological methods to help control pain such as distraction, imagery, relaxation, and application of heat and cold  Collaborate with interdisciplinary team and patient to determine appropriate pain management plan  1  Include patient in decisions related to comfort  2  Offer non-pharmacological pain management interventions  3  Report ineffective pain management to physician  Outcome: Progressing  Goal: Patient vital signs are stable  Description: 1  Assess vital signs - vaginal delivery    Outcome: Progressing     Problem: PAIN - ADULT  Goal: Verbalizes/displays adequate comfort level or baseline comfort level  Description: Interventions:  - Encourage patient to monitor pain and request assistance  - Assess pain using appropriate pain scale  - Administer analgesics based on type and severity of pain and evaluate response  - Implement non-pharmacological measures as appropriate and evaluate response  - Consider cultural and social influences on pain and pain management  - Notify physician/advanced practitioner if interventions unsuccessful or patient reports new pain  Outcome: Progressing     Problem: INFECTION - ADULT  Goal: Absence or prevention of progression during hospitalization  Description: INTERVENTIONS:  - Assess and monitor for signs and symptoms of infection  - Monitor lab/diagnostic results  - Monitor all insertion sites, i e  indwelling lines, tubes, and drains  - Monitor endotracheal if appropriate and nasal secretions for changes in amount and color  - [place] appropriate cooling/warming therapies per order  - Administer medications as ordered  - Instruct and encourage patient and family to use good hand hygiene technique  - Identify and instruct in appropriate isolation precautions for identified infection/condition  Outcome: Progressing  Goal: Absence of fever/infection during neutropenic period  Description: INTERVENTIONS:  - Monitor WBC    Outcome: Progressing     Problem: SAFETY ADULT  Goal: Patient will remain free of falls  Description: INTERVENTIONS:  - Educate patient/family on patient safety including physical limitations  - Instruct patient to call for assistance with activity   - Consult OT/PT to assist with strengthening/mobility   - Keep Call bell within reach  - Keep bed low and locked with side rails adjusted as appropriate  - Keep care items and personal belongings within reach  - Initiate and maintain comfort rounds  - Make Fall Risk Sign visible to staff  - Offer Toileting  every 2 Hours, in advance of need  Outcome: Progressing  Goal: Maintain or return to baseline ADL function  Description: INTERVENTIONS:  -  Assess patient's ability to carry out ADLs; assess patient's baseline for ADL function and identify physical deficits which impact ability to perform ADLs (bathing, care of mouth/teeth, toileting, grooming, dressing, etc )  - Assess/evaluate cause of self-care deficits - Assess range of motion  - Assess patient's mobility; develop plan if impaired  - Assess patient's need for assistive devices and provide as appropriate  - Encourage maximum independence but intervene and supervise when necessary  - Involve family in performance of ADLs  - Assess for home care needs following discharge   - Consider OT consult to assist with ADL evaluation and planning for discharge  - Provide patient education as appropriate  Outcome: Progressing  Goal: Maintains/Returns to pre admission functional level  Description: INTERVENTIONS:  - Perform BMAT or MOVE assessment daily    - Set and communicate daily mobility goal to care team and patient/family/caregiver     - Collaborate with rehabilitation services on mobility goals if consulted  - Out of bed for toileting  - Record patient progress and toleration of activity level   Outcome: Progressing

## 2023-03-06 NOTE — H&P
[address] 21 y o  female MRN: [id number]  Unit/Bed#: -01 Encounter: [id number]    Assessment: 21 y o   at 39w6d admitted for elective induction of labor  SVE: /-2  FHT: reactive  Clinical EFW: 45% ; Cephalic confirmed by TAUS  GBS status: negative     Plan:   Diet controlled gestational diabetes mellitus (GDM) in third trimester  Assessment & Plan  No results found for: HGBA1C    Recent Labs     23  1631   POCGLU 76       Blood Sugar Average: Last 72 hrs:    - 1hr gtt at 6 weeks postpartum    * 39 weeks gestation of pregnancy  Assessment & Plan  Admit   T&S, CBC, RPR  CLD  IV fluids  GBS prophylaxis is not needed  Induction with FB/cytotec  Epidural per patient request          Discussed case and plan w/ Dr Juanita Santiago      Chief Complaint: none    HPI: Macarena Veloz is a 21 y o   with an BRUNO of 3/7/2023, by Ultrasound at 39w6d who is being admitted for elective induction of labor  She denies having uterine contractions, has no LOF, and reports no VB  She states she has felt good FM  Patient Active Problem List   Diagnosis   • Chronic tension-type headache, not intractable   • Menstrual migraine without status migrainosus, not intractable   • Obesity (BMI 30 0-34  9)   • 39 weeks gestation of pregnancy   • Diet controlled gestational diabetes mellitus (GDM) in third trimester   • Obesity affecting pregnancy in third trimester       Baby complications/comments: none    Review of Systems   Constitutional: Negative for chills and fever  HENT: Negative for ear pain and sore throat  Eyes: Negative for pain and visual disturbance  Respiratory: Negative for cough and shortness of breath  Cardiovascular: Negative for chest pain and palpitations  Gastrointestinal: Negative for abdominal pain, constipation, diarrhea, nausea and vomiting  Genitourinary: Negative for dysuria, hematuria and vaginal bleeding     Musculoskeletal: Negative for arthralgias and back pain    Skin: Negative for color change and rash  Neurological: Negative for syncope, light-headedness and headaches  All other systems reviewed and are negative  OB Hx:  OB History    Para Term  AB Living   1             SAB IAB Ectopic Multiple Live Births                  # Outcome Date GA Lbr Krish/2nd Weight Sex Delivery Anes PTL Lv   1 Current                Past Medical Hx:  Past Medical History:   Diagnosis Date   • Gestational diabetes 2022   • Migraine    • Vitamin D deficiency 3/2/2022       Past Surgical hx:  Past Surgical History:   Procedure Laterality Date   • TONSILECTOMY AND ADNOIDECTOMY  2006   • TONSILLECTOMY         Social Hx:  Social History     Tobacco Use   • Smoking status: Never   • Smokeless tobacco: Never   Vaping Use   • Vaping Use: Former   • Substances: Nicotine, Flavoring   Substance Use Topics   • Alcohol use: Not Currently     Comment: socially   • Drug use: Never         Allergies   Allergen Reactions   • Sulfa Antibiotics Facial Swelling, Fever, Hives, Itching, Rash, Shortness Of Breath and Swelling       Medications Prior to Admission   Medication   • Acetaminophen (TYLENOL PO)   • Blood Glucose Monitoring Suppl (OneTouch Verio Flex System) w/Device KIT   • diphenhydrAMINE (BENADRYL) 25 mg tablet   • ondansetron (Zofran ODT) 8 mg disintegrating tablet   • OneTouch Delica Lancets 59O MISC   • OneTouch Verio test strip   • Prenatal Multivit-Min-Fe-FA (Pre- Formula) TABS   • Riboflavin 400 MG CAPS   • valACYclovir (VALTREX) 500 mg tablet       Objective:  Temp:  [98 7 °F (37 1 °C)] 98 7 °F (37 1 °C)  HR:  [96] 96  Resp:  [16] 16  BP: (132)/(83) 132/83  Body mass index is 36 21 kg/m²  Physical Exam:  Physical Exam  Constitutional:       General: She is not in acute distress  Appearance: Normal appearance  She is not ill-appearing  HENT:      Head: Normocephalic and atraumatic        Mouth/Throat:      Mouth: Mucous membranes are moist  Pharynx: Oropharynx is clear  Cardiovascular:      Rate and Rhythm: Normal rate and regular rhythm  Pulses: Normal pulses  Heart sounds: Normal heart sounds  Pulmonary:      Effort: Pulmonary effort is normal       Breath sounds: Normal breath sounds  Abdominal:      Palpations: Abdomen is soft  Neurological:      General: No focal deficit present  Mental Status: She is alert and oriented to person, place, and time  Mental status is at baseline  Skin:     General: Skin is warm and dry  Capillary Refill: Capillary refill takes less than 2 seconds  Vitals reviewed              FHT:  Baseline Rate: 130 bpm  Variability: Moderate 6-25 bpm  Accelerations: 15 x 15 or greater, At variable times  Decelerations: None    TOCO:   Contraction Frequency (minutes): x 1  Contraction Duration (seconds): 90  Contraction Quality: Mild    Lab Results   Component Value Date    WBC 8 88 03/06/2023    HGB 11 6 03/06/2023    HCT 34 9 03/06/2023     03/06/2023     No results found for: NA, K, CL, CO2, BUN, CREATININE, GLUCOSE, AST, ALT  Prenatal Labs: Reviewed      Blood type: A pos  Antibody: negative  GBS: neg  HIV: non-reactive  Rubella: Immune  VDRL/RPR: Non reactive  HBsAg: Negative  Chlamydia: Negative  Gonorrhea: Negative  Diabetes 1 hour screen: 140  3 hour glucose: fasting glucose 98  Platelets: 039  Hgb: 11 6  >2 Midnights  INPATIENT     Signature/Title: Sneha Pinon MD  Date: 3/6/2023  Time: 5:43 PM

## 2023-03-06 NOTE — OB LABOR/OXYTOCIN SAFETY PROGRESS
Labor Progress Note - Rubin Johnsonuchijeannette 21 y o  female MRN: [id number]    Unit/Bed#: -01 Encounter: [id number]                 Cervical Dilation: 1        Cervical Effacement: 79  Fetal Station: -2  Baseline Rate: 140 bpm  Fetal Heart Rate: 132 BPM                  Vital Signs:   Vitals:    03/06/23 1525   Resp: 16   Temp: 98 7 °F (37 1 °C)       Notes/comments:     PROCEDURE:  HUFFMAN BALLOON PLACEMENT    A 24F huffman with a 30cc balloon was selected, SVE was performed and cervix was located, huffman was introduced over sterile gloved hands  Balloon advanced through cervix beyond the internal cervical os  A small amount amount of sterile saline solution was instilled in the balloon to confirm placement  Placement was confirmed to be beyond the internal cervical os  A total of 60cc of sterile saline solution was placed into the balloon  Pt tolerated well  Instructions left with RN to place huffman to gravity with a 1L bag of IV fluid  Notify MD when huffman dislodged  Vaginal Cytotec placed after FB inserted  FHT category I        MD Beltran Austin MD 3/6/2023 4:55 PM

## 2023-03-06 NOTE — ASSESSMENT & PLAN NOTE
Admit   T&S, CBC, RPR  CLD  IV fluids  GBS prophylaxis is not needed  Induction with FB/cytotec  Epidural per patient request

## 2023-03-07 PROBLEM — Z98.891 STATUS POST PRIMARY LOW TRANSVERSE CESAREAN SECTION: Status: ACTIVE | Noted: 2023-03-07

## 2023-03-07 PROBLEM — O13.9 GESTATIONAL HYPERTENSION: Status: ACTIVE | Noted: 2023-03-07

## 2023-03-07 LAB
ALBUMIN SERPL BCP-MCNC: 3.4 G/DL (ref 3.5–5)
ALBUMIN SERPL BCP-MCNC: 3.6 G/DL (ref 3.5–5)
ALP SERPL-CCNC: 153 U/L (ref 34–104)
ALP SERPL-CCNC: 154 U/L (ref 34–104)
ALT SERPL W P-5'-P-CCNC: 10 U/L (ref 7–52)
ALT SERPL W P-5'-P-CCNC: 11 U/L (ref 7–52)
ANION GAP SERPL CALCULATED.3IONS-SCNC: 10 MMOL/L (ref 4–13)
ANION GAP SERPL CALCULATED.3IONS-SCNC: 7 MMOL/L (ref 4–13)
AST SERPL W P-5'-P-CCNC: 16 U/L (ref 13–39)
AST SERPL W P-5'-P-CCNC: 18 U/L (ref 13–39)
BASE EXCESS BLDCOA CALC-SCNC: -6.4 MMOL/L (ref 3–11)
BASE EXCESS BLDCOV CALC-SCNC: -6 MMOL/L (ref 1–9)
BILIRUB SERPL-MCNC: 0.37 MG/DL (ref 0.2–1)
BILIRUB SERPL-MCNC: 0.4 MG/DL (ref 0.2–1)
BUN SERPL-MCNC: 14 MG/DL (ref 5–25)
BUN SERPL-MCNC: 18 MG/DL (ref 5–25)
CALCIUM ALBUM COR SERPL-MCNC: 8.3 MG/DL (ref 8.3–10.1)
CALCIUM SERPL-MCNC: 7.8 MG/DL (ref 8.4–10.2)
CALCIUM SERPL-MCNC: 8.9 MG/DL (ref 8.4–10.2)
CHLORIDE SERPL-SCNC: 108 MMOL/L (ref 96–108)
CHLORIDE SERPL-SCNC: 111 MMOL/L (ref 96–108)
CO2 SERPL-SCNC: 17 MMOL/L (ref 21–32)
CO2 SERPL-SCNC: 17 MMOL/L (ref 21–32)
CREAT SERPL-MCNC: 0.59 MG/DL (ref 0.6–1.3)
CREAT SERPL-MCNC: 0.63 MG/DL (ref 0.6–1.3)
CREAT UR-MCNC: <1 MG/DL
GFR SERPL CREATININE-BSD FRML MDRD: 126 ML/MIN/1.73SQ M
GFR SERPL CREATININE-BSD FRML MDRD: 129 ML/MIN/1.73SQ M
GLUCOSE SERPL-MCNC: 122 MG/DL (ref 65–140)
GLUCOSE SERPL-MCNC: 85 MG/DL (ref 65–140)
GLUCOSE SERPL-MCNC: 86 MG/DL (ref 65–140)
HCO3 BLDCOA-SCNC: 22 MMOL/L (ref 17.3–27.3)
HCO3 BLDCOV-SCNC: 19.7 MMOL/L (ref 12.2–28.6)
O2 CT VFR BLDCOA CALC: 2.7 ML/DL
OXYHGB MFR BLDCOA: 12.2 %
OXYHGB MFR BLDCOV: 50.8 %
PCO2 BLDCOA: 55.1 MM[HG] (ref 30–60)
PCO2 BLDCOV: 39.8 MM HG (ref 27–43)
PH BLDCOA: 7.22 [PH] (ref 7.23–7.43)
PH BLDCOV: 7.31 [PH] (ref 7.19–7.49)
PO2 BLDCOA: 10.2 MM HG (ref 5–25)
PO2 BLDCOV: 23.1 MM HG (ref 15–45)
POTASSIUM SERPL-SCNC: 4.1 MMOL/L (ref 3.5–5.3)
POTASSIUM SERPL-SCNC: 4.4 MMOL/L (ref 3.5–5.3)
PROT SERPL-MCNC: 6.3 G/DL (ref 6.4–8.4)
PROT SERPL-MCNC: 6.6 G/DL (ref 6.4–8.4)
PROT UR-MCNC: <4 MG/DL
SAO2 % BLDCOV: 11.5 ML/DL
SODIUM SERPL-SCNC: 135 MMOL/L (ref 135–147)
SODIUM SERPL-SCNC: 135 MMOL/L (ref 135–147)

## 2023-03-07 PROCEDURE — 10907ZC DRAINAGE OF AMNIOTIC FLUID, THERAPEUTIC FROM PRODUCTS OF CONCEPTION, VIA NATURAL OR ARTIFICIAL OPENING: ICD-10-PCS | Performed by: OBSTETRICS & GYNECOLOGY

## 2023-03-07 PROCEDURE — 4A1H7CZ MONITORING OF PRODUCTS OF CONCEPTION, CARDIAC RATE, VIA NATURAL OR ARTIFICIAL OPENING: ICD-10-PCS | Performed by: OBSTETRICS & GYNECOLOGY

## 2023-03-07 PROCEDURE — 10H07YZ INSERTION OF OTHER DEVICE INTO PRODUCTS OF CONCEPTION, VIA NATURAL OR ARTIFICIAL OPENING: ICD-10-PCS | Performed by: OBSTETRICS & GYNECOLOGY

## 2023-03-07 PROCEDURE — 10H073Z INSERTION OF MONITORING ELECTRODE INTO PRODUCTS OF CONCEPTION, VIA NATURAL OR ARTIFICIAL OPENING: ICD-10-PCS | Performed by: OBSTETRICS & GYNECOLOGY

## 2023-03-07 RX ORDER — EPHEDRINE SULFATE 50 MG/ML
INJECTION INTRAVENOUS AS NEEDED
Status: DISCONTINUED | OUTPATIENT
Start: 2023-03-07 | End: 2023-03-07

## 2023-03-07 RX ORDER — ENOXAPARIN SODIUM 100 MG/ML
40 INJECTION SUBCUTANEOUS
Status: DISCONTINUED | OUTPATIENT
Start: 2023-03-08 | End: 2023-03-09 | Stop reason: HOSPADM

## 2023-03-07 RX ORDER — NALOXONE HYDROCHLORIDE 0.4 MG/ML
0.1 INJECTION, SOLUTION INTRAMUSCULAR; INTRAVENOUS; SUBCUTANEOUS
Status: DISCONTINUED | OUTPATIENT
Start: 2023-03-07 | End: 2023-03-07

## 2023-03-07 RX ORDER — METOCLOPRAMIDE HYDROCHLORIDE 5 MG/ML
5 INJECTION INTRAMUSCULAR; INTRAVENOUS EVERY 6 HOURS PRN
Status: ACTIVE | OUTPATIENT
Start: 2023-03-07 | End: 2023-03-08

## 2023-03-07 RX ORDER — CALCIUM CARBONATE 200(500)MG
1000 TABLET,CHEWABLE ORAL DAILY PRN
Status: DISCONTINUED | OUTPATIENT
Start: 2023-03-07 | End: 2023-03-09 | Stop reason: HOSPADM

## 2023-03-07 RX ORDER — ACETAMINOPHEN 325 MG/1
650 TABLET ORAL EVERY 6 HOURS
Status: DISCONTINUED | OUTPATIENT
Start: 2023-03-07 | End: 2023-03-09 | Stop reason: HOSPADM

## 2023-03-07 RX ORDER — ENOXAPARIN SODIUM 100 MG/ML
40 INJECTION SUBCUTANEOUS DAILY
Status: DISCONTINUED | OUTPATIENT
Start: 2023-03-07 | End: 2023-03-07

## 2023-03-07 RX ORDER — MORPHINE SULFATE 0.5 MG/ML
INJECTION, SOLUTION EPIDURAL; INTRATHECAL; INTRAVENOUS AS NEEDED
Status: DISCONTINUED | OUTPATIENT
Start: 2023-03-07 | End: 2023-03-07

## 2023-03-07 RX ORDER — DEXAMETHASONE SODIUM PHOSPHATE 4 MG/ML
8 INJECTION, SOLUTION INTRA-ARTICULAR; INTRALESIONAL; INTRAMUSCULAR; INTRAVENOUS; SOFT TISSUE ONCE
Status: DISCONTINUED | OUTPATIENT
Start: 2023-03-07 | End: 2023-03-09 | Stop reason: HOSPADM

## 2023-03-07 RX ORDER — ROPIVACAINE HYDROCHLORIDE 2 MG/ML
INJECTION, SOLUTION EPIDURAL; INFILTRATION; PERINEURAL AS NEEDED
Status: DISCONTINUED | OUTPATIENT
Start: 2023-03-07 | End: 2023-03-07

## 2023-03-07 RX ORDER — ONDANSETRON 2 MG/ML
INJECTION INTRAMUSCULAR; INTRAVENOUS AS NEEDED
Status: DISCONTINUED | OUTPATIENT
Start: 2023-03-07 | End: 2023-03-07

## 2023-03-07 RX ORDER — KETOROLAC TROMETHAMINE 30 MG/ML
30 INJECTION, SOLUTION INTRAMUSCULAR; INTRAVENOUS EVERY 6 HOURS
Status: COMPLETED | OUTPATIENT
Start: 2023-03-07 | End: 2023-03-08

## 2023-03-07 RX ORDER — FENTANYL CITRATE/PF 50 MCG/ML
50 SYRINGE (ML) INJECTION
Status: DISCONTINUED | OUTPATIENT
Start: 2023-03-07 | End: 2023-03-09 | Stop reason: HOSPADM

## 2023-03-07 RX ORDER — METOCLOPRAMIDE HYDROCHLORIDE 5 MG/ML
INJECTION INTRAMUSCULAR; INTRAVENOUS AS NEEDED
Status: DISCONTINUED | OUTPATIENT
Start: 2023-03-07 | End: 2023-03-07

## 2023-03-07 RX ORDER — SODIUM CHLORIDE, SODIUM LACTATE, POTASSIUM CHLORIDE, CALCIUM CHLORIDE 600; 310; 30; 20 MG/100ML; MG/100ML; MG/100ML; MG/100ML
125 INJECTION, SOLUTION INTRAVENOUS CONTINUOUS
Status: DISCONTINUED | OUTPATIENT
Start: 2023-03-07 | End: 2023-03-09 | Stop reason: HOSPADM

## 2023-03-07 RX ORDER — HYDROMORPHONE HCL/PF 1 MG/ML
0.5 SYRINGE (ML) INJECTION EVERY 2 HOUR PRN
Status: DISCONTINUED | OUTPATIENT
Start: 2023-03-07 | End: 2023-03-09 | Stop reason: HOSPADM

## 2023-03-07 RX ORDER — LIDOCAINE HYDROCHLORIDE AND EPINEPHRINE 15; 5 MG/ML; UG/ML
INJECTION, SOLUTION EPIDURAL AS NEEDED
Status: DISCONTINUED | OUTPATIENT
Start: 2023-03-07 | End: 2023-03-07

## 2023-03-07 RX ORDER — DOCUSATE SODIUM 100 MG/1
100 CAPSULE, LIQUID FILLED ORAL 2 TIMES DAILY
Status: DISCONTINUED | OUTPATIENT
Start: 2023-03-07 | End: 2023-03-09 | Stop reason: HOSPADM

## 2023-03-07 RX ORDER — HYDROMORPHONE HCL/PF 1 MG/ML
0.5 SYRINGE (ML) INJECTION
Status: DISCONTINUED | OUTPATIENT
Start: 2023-03-07 | End: 2023-03-09 | Stop reason: HOSPADM

## 2023-03-07 RX ORDER — DIPHENHYDRAMINE HYDROCHLORIDE 50 MG/ML
12.5 INJECTION INTRAMUSCULAR; INTRAVENOUS EVERY 6 HOURS PRN
Status: DISPENSED | OUTPATIENT
Start: 2023-03-07 | End: 2023-03-08

## 2023-03-07 RX ORDER — NALOXONE HYDROCHLORIDE 0.4 MG/ML
0.1 INJECTION, SOLUTION INTRAMUSCULAR; INTRAVENOUS; SUBCUTANEOUS
Status: ACTIVE | OUTPATIENT
Start: 2023-03-07 | End: 2023-03-08

## 2023-03-07 RX ORDER — FENTANYL CITRATE 50 UG/ML
INJECTION, SOLUTION INTRAMUSCULAR; INTRAVENOUS AS NEEDED
Status: DISCONTINUED | OUTPATIENT
Start: 2023-03-07 | End: 2023-03-07

## 2023-03-07 RX ORDER — KETOROLAC TROMETHAMINE 30 MG/ML
INJECTION, SOLUTION INTRAMUSCULAR; INTRAVENOUS AS NEEDED
Status: DISCONTINUED | OUTPATIENT
Start: 2023-03-07 | End: 2023-03-07

## 2023-03-07 RX ORDER — CEFAZOLIN SODIUM 2 G/50ML
2000 SOLUTION INTRAVENOUS ONCE
Status: COMPLETED | OUTPATIENT
Start: 2023-03-07 | End: 2023-03-07

## 2023-03-07 RX ORDER — HYDROMORPHONE HCL/PF 1 MG/ML
0.5 SYRINGE (ML) INJECTION EVERY 2 HOUR PRN
Status: DISCONTINUED | OUTPATIENT
Start: 2023-03-07 | End: 2023-03-07

## 2023-03-07 RX ORDER — ONDANSETRON 2 MG/ML
4 INJECTION INTRAMUSCULAR; INTRAVENOUS ONCE AS NEEDED
Status: DISCONTINUED | OUTPATIENT
Start: 2023-03-07 | End: 2023-03-09 | Stop reason: HOSPADM

## 2023-03-07 RX ORDER — OXYTOCIN/RINGER'S LACTATE 30/500 ML
PLASTIC BAG, INJECTION (ML) INTRAVENOUS CONTINUOUS PRN
Status: DISCONTINUED | OUTPATIENT
Start: 2023-03-07 | End: 2023-03-07

## 2023-03-07 RX ORDER — OXYTOCIN/RINGER'S LACTATE 30/500 ML
62.5 PLASTIC BAG, INJECTION (ML) INTRAVENOUS ONCE
Status: COMPLETED | OUTPATIENT
Start: 2023-03-07 | End: 2023-03-07

## 2023-03-07 RX ORDER — ACETAMINOPHEN 325 MG/1
650 TABLET ORAL EVERY 6 HOURS SCHEDULED
Status: CANCELLED | OUTPATIENT
Start: 2023-03-07 | End: 2023-03-10

## 2023-03-07 RX ORDER — LIDOCAINE HYDROCHLORIDE AND EPINEPHRINE 20; 5 MG/ML; UG/ML
INJECTION, SOLUTION EPIDURAL; INFILTRATION; INTRACAUDAL; PERINEURAL AS NEEDED
Status: DISCONTINUED | OUTPATIENT
Start: 2023-03-07 | End: 2023-03-07

## 2023-03-07 RX ADMIN — ACETAMINOPHEN 650 MG: 325 TABLET ORAL at 20:34

## 2023-03-07 RX ADMIN — LIDOCAINE HYDROCHLORIDE AND EPINEPHRINE 5 ML: 20; 5 INJECTION, SOLUTION EPIDURAL; INFILTRATION; INTRACAUDAL; PERINEURAL at 05:47

## 2023-03-07 RX ADMIN — FENTANYL CITRATE 100 MCG: 50 INJECTION INTRAMUSCULAR; INTRAVENOUS at 05:25

## 2023-03-07 RX ADMIN — LIDOCAINE HYDROCHLORIDE AND EPINEPHRINE 5 ML: 20; 5 INJECTION, SOLUTION EPIDURAL; INFILTRATION; INTRACAUDAL; PERINEURAL at 05:07

## 2023-03-07 RX ADMIN — ROPIVACAINE HYDROCHLORIDE: 2 INJECTION, SOLUTION EPIDURAL; INFILTRATION at 00:11

## 2023-03-07 RX ADMIN — MORPHINE SULFATE 3 MG: 0.5 INJECTION, SOLUTION EPIDURAL; INTRATHECAL; INTRAVENOUS at 06:12

## 2023-03-07 RX ADMIN — ROPIVACAINE HYDROCHLORIDE 5 ML: 2 INJECTION, SOLUTION EPIDURAL; INFILTRATION at 00:20

## 2023-03-07 RX ADMIN — Medication 500 MG: at 05:22

## 2023-03-07 RX ADMIN — CEFAZOLIN SODIUM 2000 MG: 2 SOLUTION INTRAVENOUS at 05:23

## 2023-03-07 RX ADMIN — Medication 62.5 MILLI-UNITS/MIN: at 06:45

## 2023-03-07 RX ADMIN — DIPHENHYDRAMINE HYDROCHLORIDE 12.5 MG: 50 INJECTION, SOLUTION INTRAMUSCULAR; INTRAVENOUS at 20:34

## 2023-03-07 RX ADMIN — LIDOCAINE HYDROCHLORIDE AND EPINEPHRINE 5 ML: 20; 5 INJECTION, SOLUTION EPIDURAL; INFILTRATION; INTRACAUDAL; PERINEURAL at 05:18

## 2023-03-07 RX ADMIN — KETOROLAC TROMETHAMINE 30 MG: 30 INJECTION, SOLUTION INTRAMUSCULAR at 17:17

## 2023-03-07 RX ADMIN — Medication 500 MILLI-UNITS/MIN: at 05:36

## 2023-03-07 RX ADMIN — SODIUM CHLORIDE 125 ML/HR: 0.9 INJECTION, SOLUTION INTRAVENOUS at 02:01

## 2023-03-07 RX ADMIN — DOCUSATE SODIUM 100 MG: 100 CAPSULE, LIQUID FILLED ORAL at 17:17

## 2023-03-07 RX ADMIN — ONDANSETRON 4 MG: 2 INJECTION INTRAMUSCULAR; INTRAVENOUS at 05:23

## 2023-03-07 RX ADMIN — KETOROLAC TROMETHAMINE 30 MG: 30 INJECTION, SOLUTION INTRAMUSCULAR at 05:58

## 2023-03-07 RX ADMIN — EPHEDRINE SULFATE 10 MG: 50 INJECTION, SOLUTION INTRAVENOUS at 06:03

## 2023-03-07 RX ADMIN — LIDOCAINE HYDROCHLORIDE AND EPINEPHRINE 2 ML: 15; 5 INJECTION, SOLUTION EPIDURAL at 00:16

## 2023-03-07 RX ADMIN — METOCLOPRAMIDE 10 MG: 5 INJECTION, SOLUTION INTRAMUSCULAR; INTRAVENOUS at 05:51

## 2023-03-07 RX ADMIN — ACETAMINOPHEN 650 MG: 325 TABLET ORAL at 02:04

## 2023-03-07 RX ADMIN — LIDOCAINE HYDROCHLORIDE AND EPINEPHRINE 3 ML: 15; 5 INJECTION, SOLUTION EPIDURAL at 00:11

## 2023-03-07 RX ADMIN — SODIUM CHLORIDE, SODIUM LACTATE, POTASSIUM CHLORIDE, AND CALCIUM CHLORIDE 125 ML/HR: .6; .31; .03; .02 INJECTION, SOLUTION INTRAVENOUS at 10:14

## 2023-03-07 RX ADMIN — LIDOCAINE HYDROCHLORIDE AND EPINEPHRINE 5 ML: 20; 5 INJECTION, SOLUTION EPIDURAL; INFILTRATION; INTRACAUDAL; PERINEURAL at 05:23

## 2023-03-07 NOTE — OB LABOR/OXYTOCIN SAFETY PROGRESS
Oxytocin Safety Progress Check Note - Johann Dasilva 21 y o  female MRN: [id number]    Unit/Bed#: -01 Encounter: [id number]    Dose (renetta-units/min) Oxytocin: 0 renetta-units/min  Contraction Frequency (minutes): 3-3 5  Contraction Quality: Mild  Tachysystole: No   Cervical Dilation: 6        Cervical Effacement: 100  Fetal Station: 0  Baseline Rate: 125 bpm  Fetal Heart Rate: 110 BPM  FHR Category: Category II  Oxytocin Safety Progress Check: Safety check completed            Vital Signs:   Vitals:    03/07/23 0447   BP: 142/97   Pulse: (!) 141   Resp:    Temp:    SpO2:        Notes/comments:   Pitocin has been turned off for 90 minutes  Continues to have variable and late decelerations  Unable to restart pitocin secondary to Cat II fetal tracing  SVE is still at 6 cm  Recommend proceeding with 1LTCS, pt  And her partner agree with plan of care       Danielle Domingo MD 3/7/2023 4:53 AM

## 2023-03-07 NOTE — DISCHARGE SUMMARY
Obstetrics Discharge Summary   Marguerite Covington 21 y o  female MRN: [id number]  Unit/Bed#: LD PACU- Encounter: [id number]    Admission Date: 3/6/2023     Discharge Date: 3/9/23    Admitting Attending: Dr Anabel Cook   Delivery Attending: Dr Anabel Cook   Discharge Attending: Dr Carol Laura      Admitting Diagnoses:   37w11d gestational age pregnancy  A1GDM     Discharge Diagnoses:   Same, delivered  Gestational Hypertension    Procedures:   primary  section, low transverse incision        Hospital Course:   Marguerite Covington is now a 21 y o   who was initially admitted at 39w6d for induction of labor secondary to A1GDM  Her induction was started with placement pf a huffman balloon, followed by pitocin titration  Patient progressed to 6/100/0  Due to recurrent late decelerations and variables pitocin was turned  and resuscitative measure taken  The CAT 2 tracing persisted, thus the decision was made to take the patient to the OR for a  delivery  She underwent an uncomplicated  low transverse  section delivery on 2023 and delivered a viable male  at 5  APGARS were 9, 9 at 1 and 5 minutes, respectively  's birth weight was 6 lb 9 6 oz  Placenta delivered without difficulty   was admitted to the  nursery  Patient tolerated the procedure well and was transferred to recovery in stable condition  The patient's post partum course was unremarkable    Her preoperative hemoglobin was 11 6g/dL, postoperative was 9 0  She received PO Iron  Her postoperative pain was well controlled with oral analgesics  On day of discharge, she was ambulating and able to reasonably perform all ADLs  She was voiding and had appropriate bowel function  Pain was well controlled  She was discharged home on post-operative day #2 without complications   Patient was instructed to follow up with her OB as an outpatient and was given appropriate warnings to call provider if she develops signs of infection or uncontrolled pain  She is breast feeding   Mom's blood type is A positive  RhoGAM was not indicated  Complications:   None    Condition at discharge:   good     Provisions for Follow-Up Care:  See after visit summary for information related to follow-up care and any pertinent home health orders  Disposition:   Home    Planned Readmission:   No    Discharge Medications:   Please see AVS for a complete list of discharge medications  Discharge instructions :   Please see AVS for complete discharge instructions

## 2023-03-07 NOTE — ANESTHESIA POSTPROCEDURE EVALUATION
Post-Op Assessment Note    CV Status:  Stable  Pain Score: 0    Pain management: adequate  Multimodal analgesia used between 6 hours prior to anesthesia start to PACU discharge    Mental Status:  Sleepy   Hydration Status:  Euvolemic   PONV Controlled:  Controlled   Airway Patency:  Patent      Post Op Vitals Reviewed: Yes      Staff: Anesthesiologist   Comments: catheter removed    Post-op block assessment: catheter intact and no complications      No notable events documented      BP   114/64   Temp   37 6   Pulse  84   Resp   16   SpO2   95

## 2023-03-07 NOTE — OB LABOR/OXYTOCIN SAFETY PROGRESS
Oxytocin Safety Progress Check Note - Eliseo Levin 21 y o  female MRN: [id number]    Unit/Bed#: -01 Encounter: [id number]    Dose (renetta-units/min) Oxytocin: 2 renetta-units/min  Contraction Frequency (minutes): 2-2 5  Contraction Quality: Mild  Tachysystole: No   Cervical Dilation: 5-6        Cervical Effacement: 70  Fetal Station: -2  Baseline Rate: 135 bpm  Fetal Heart Rate: 136 BPM  FHR Category: Category I               Vital Signs:   Vitals:    03/07/23 0228   BP: 117/73   Pulse: 76   Resp:    Temp:    SpO2:        Notes/comments:     SVE is now 5 5/70/-2  FHT is CAT 1 with one variable  AROm performed and clear fluid noted  Plan to continue pit titration and recheck in 2 hours     Ivan Thorne MD 3/7/2023 2:43 AM

## 2023-03-07 NOTE — OB LABOR/OXYTOCIN SAFETY PROGRESS
Oxytocin Safety Progress Check Note - Amisha Pinedo 21 y o  female MRN: [id number]    Unit/Bed#: -01 Encounter: [id number]    Dose (renetta-units/min) Oxytocin: 4 renetta-units/min  Contraction Frequency (minutes): 1 5-2  Contraction Quality: Mild  Tachysystole: No   Cervical Dilation: 5        Cervical Effacement: 70  Fetal Station: -2  Baseline Rate: 130 bpm  Fetal Heart Rate: 122 BPM  FHR Category: Category II               Vital Signs:   Vitals:    03/07/23 0044   BP: 112/65   Pulse: 77   Resp:    Temp:    SpO2:        Notes/comments: Patient now has epidural  SVE is CAT 2 due to recurrent decelerations   Patient given bolus of fluid and repositioned  Pitocin is at 4  If decels recurrent, consider reducing pit dose           Starla Morales MD 3/7/2023 12:46 AM

## 2023-03-07 NOTE — OB LABOR/OXYTOCIN SAFETY PROGRESS
Labor Progress Note - Kyleigh Kinsey 21 y o  female MRN: [id number]    Unit/Bed#: -01 Encounter: [id number]       Contraction Frequency (minutes): 4 5-6  Contraction Quality: Mild  Tachysystole: No   Cervical Dilation: 4        Cervical Effacement: 70  Fetal Station: -2  Baseline Rate: 120 bpm  Fetal Heart Rate: 132 BPM  FHR Category: Category I               Vital Signs:   Vitals:    03/06/23 1930   BP: 143/89   Pulse: 72   Resp: 18   Temp: 98 1 °F (36 7 °C)   SpO2: 99%       Notes/comments: Juan balloon dislodged  SVe is now 4/80/-2  FHT is CAT 1  Plan to start pitocin titration after a small snack         Discussed with Dr Tiffany Bower MD 3/6/2023 9:20 PM

## 2023-03-07 NOTE — ASSESSMENT & PLAN NOTE
Systolic (06BRI), UFL:731 , Min:121 , IEV:941     Diastolic (31SLS), QXJ:15, Min:68, Max:82    CBC/CMP wnl, PCr undetectable

## 2023-03-07 NOTE — PLAN OF CARE
Problem: PAIN - ADULT  Goal: Verbalizes/displays adequate comfort level or baseline comfort level  Description: Interventions:  - Encourage patient to monitor pain and request assistance  - Assess pain using appropriate pain scale  - Administer analgesics based on type and severity of pain and evaluate response  - Implement non-pharmacological measures as appropriate and evaluate response  - Consider cultural and social influences on pain and pain management  - Notify physician/advanced practitioner if interventions unsuccessful or patient reports new pain  Outcome: Progressing     Problem: INFECTION - ADULT  Goal: Absence or prevention of progression during hospitalization  Description: INTERVENTIONS:  - Assess and monitor for signs and symptoms of infection  - Monitor lab/diagnostic results  - Monitor all insertion sites, i e  indwelling lines, tubes, and drains  - Monitor endotracheal if appropriate and nasal secretions for changes in amount and color  - [place] appropriate cooling/warming therapies per order  - Administer medications as ordered  - Instruct and encourage patient and family to use good hand hygiene technique  - Identify and instruct in appropriate isolation precautions for identified infection/condition  Outcome: Progressing  Goal: Absence of fever/infection during neutropenic period  Description: INTERVENTIONS:  - Monitor WBC    Outcome: Progressing     Problem: SAFETY ADULT  Goal: Patient will remain free of falls  Description: INTERVENTIONS:  - Educate patient/family on patient safety including physical limitations  - Instruct patient to call for assistance with activity   - Consult OT/PT to assist with strengthening/mobility   - Keep Call bell within reach  - Keep bed low and locked with side rails adjusted as appropriate  - Keep care items and personal belongings within reach  - Initiate and maintain comfort rounds  - Make Fall Risk Sign visible to staff  - Offer Toileting post armida removal  - Apply yellow socks and bracelet for high fall risk patients  - Consider moving patient to room near nurses station  Outcome: Progressing  Goal: Maintain or return to baseline ADL function  Description: INTERVENTIONS:  -  Assess patient's ability to carry out ADLs; assess patient's baseline for ADL function and identify physical deficits which impact ability to perform ADLs (bathing, care of mouth/teeth, toileting, grooming, dressing, etc )  - Assess/evaluate cause of self-care deficits   - Assess range of motion  - Assess patient's mobility; develop plan if impaired  - Assess patient's need for assistive devices and provide as appropriate  - Encourage maximum independence but intervene and supervise when necessary  - Involve family in performance of ADLs  - Assess for home care needs following discharge   - Consider OT consult to assist with ADL evaluation and planning for discharge  - Provide patient education as appropriate  Outcome: Progressing  Goal: Maintains/Returns to pre admission functional level  Description: INTERVENTIONS:  - Perform BMAT or MOVE assessment daily    - Set and communicate daily mobility goal to care team and patient/family/caregiver     - Collaborate with rehabilitation services on mobility goals if consulted  - Reposition patient   - Out of bed for toileting  - Record patient progress and toleration of activity level   Outcome: Progressing     Problem: DISCHARGE PLANNING  Goal: Discharge to home or other facility with appropriate resources  Description: INTERVENTIONS:  - Identify barriers to discharge w/patient and caregiver  - Arrange for needed discharge resources and transportation as appropriate  - Identify discharge learning needs (meds, wound care, etc )  - Arrange for interpretive services to assist at discharge as needed  - Refer to Case Management Department for coordinating discharge planning if the patient needs post-hospital services based on physician/advanced practitioner order or complex needs related to functional status, cognitive ability, or social support system  Outcome: Progressing     Problem: POSTPARTUM  Goal: Experiences normal postpartum course  Description: INTERVENTIONS:  - Monitor maternal vital signs  - Assess uterine involution and lochia  Outcome: Progressing  Goal: Appropriate maternal -  bonding  Description: INTERVENTIONS:  - Identify family support  - Assess for appropriate maternal/infant bonding   -Encourage maternal/infant bonding opportunities  - Referral to  or  as needed  Outcome: Progressing  Goal: Establishment of infant feeding pattern  Description: INTERVENTIONS:  - Assess breast/bottle feeding  - Refer to lactation as needed  Outcome: Progressing  Goal: Incision(s), wounds(s) or drain site(s) healing without S/S of infection  Description: INTERVENTIONS  - Assess and document dressing, incision, wound bed, drain sites and surrounding tissue  - Provide patient and family education    Outcome: Progressing

## 2023-03-07 NOTE — OB LABOR/OXYTOCIN SAFETY PROGRESS
Oxytocin Safety Progress Check Note - Thierry Lugo 21 y o  female MRN: [id number]    Unit/Bed#: -01 Encounter: [id number]    Dose (renetta-units/min) Oxytocin: 2 renetta-units/min  Contraction Frequency (minutes): 2-2 5  Contraction Quality: Mild  Tachysystole: No   Cervical Dilation: 6        Cervical Effacement: 70  Fetal Station: 0  Baseline Rate: 135 bpm  Fetal Heart Rate: 136 BPM  FHR Category: Category I               Vital Signs:   Vitals:    03/07/23 0228   BP: 117/73   Pulse: 76   Resp:    Temp:    SpO2:        Notes/comments: SVE is now 6/90/0  FHT is CAT 2 due to recurrent decelerations and variables   Pitocin turned off   Plan to restart pitocin titration if FHT is CAT 1 and consider placing IUPC for amnioinfusion          Selma Conrad MD 3/7/2023 3:06 AM

## 2023-03-07 NOTE — PLAN OF CARE
Problem: Knowledge Deficit  Goal: Verbalizes understanding of labor plan  Description: Assess patient/family/caregiver's baseline knowledge level and ability to understand information  Provide education via patient/family/caregiver's preferred learning method at appropriate level of understanding  1  Provide teaching at level of understanding  2  Provide teaching via preferred learning method(s)  Outcome: Progressing  Goal: Patient/family/caregiver demonstrates understanding of disease process, treatment plan, medications, and discharge instructions  Description: Complete learning assessment and assess knowledge base  Interventions:  - Provide teaching at level of understanding  - Provide teaching via preferred learning methods  Outcome: Progressing     Problem: Labor & Delivery  Goal: Manages discomfort  Description: Assess and monitor for signs and symptoms of discomfort  Assess patient's pain level regularly and per hospital policy  Administer medications as ordered  Support use of nonpharmacological methods to help control pain such as distraction, imagery, relaxation, and application of heat and cold  Collaborate with interdisciplinary team and patient to determine appropriate pain management plan  1  Include patient in decisions related to comfort  2  Offer non-pharmacological pain management interventions  3  Report ineffective pain management to physician  Outcome: Progressing  Goal: Patient vital signs are stable  Description: 1  Assess vital signs - vaginal delivery    Outcome: Progressing     Problem: PAIN - ADULT  Goal: Verbalizes/displays adequate comfort level or baseline comfort level  Description: Interventions:  - Encourage patient to monitor pain and request assistance  - Assess pain using appropriate pain scale  - Administer analgesics based on type and severity of pain and evaluate response  - Implement non-pharmacological measures as appropriate and evaluate response  - Consider cultural and social influences on pain and pain management  - Notify physician/advanced practitioner if interventions unsuccessful or patient reports new pain  Outcome: Progressing     Problem: INFECTION - ADULT  Goal: Absence or prevention of progression during hospitalization  Description: INTERVENTIONS:  - Assess and monitor for signs and symptoms of infection  - Monitor lab/diagnostic results  - Monitor all insertion sites, i e  indwelling lines, tubes, and drains  - Monitor endotracheal if appropriate and nasal secretions for changes in amount and color  - [place] appropriate cooling/warming therapies per order  - Administer medications as ordered  - Instruct and encourage patient and family to use good hand hygiene technique  - Identify and instruct in appropriate isolation precautions for identified infection/condition  Outcome: Progressing  Goal: Absence of fever/infection during neutropenic period  Description: INTERVENTIONS:  - Monitor WBC    Outcome: Progressing     Problem: SAFETY ADULT  Goal: Patient will remain free of falls  Description: INTERVENTIONS:  - Educate patient/family on patient safety including physical limitations  - Instruct patient to call for assistance with activity   - Consult OT/PT to assist with strengthening/mobility   - Keep Call bell within reach  - Keep bed low and locked with side rails adjusted as appropriate  - Keep care items and personal belongings within reach  - Initiate and maintain comfort rounds  - Make Fall Risk Sign visible to staff  - Offer Toileting every  Hours, in advance of need  - Initiate/Maintain alarm  - Obtain necessary fall risk management equipment:   - Apply yellow socks and bracelet for high fall risk patients  - Consider moving patient to room near nurses station  Outcome: Progressing  Goal: Maintain or return to baseline ADL function  Description: INTERVENTIONS:  -  Assess patient's ability to carry out ADLs; assess patient's baseline for ADL function and identify physical deficits which impact ability to perform ADLs (bathing, care of mouth/teeth, toileting, grooming, dressing, etc )  - Assess/evaluate cause of self-care deficits   - Assess range of motion  - Assess patient's mobility; develop plan if impaired  - Assess patient's need for assistive devices and provide as appropriate  - Encourage maximum independence but intervene and supervise when necessary  - Involve family in performance of ADLs  - Assess for home care needs following discharge   - Consider OT consult to assist with ADL evaluation and planning for discharge  - Provide patient education as appropriate  Outcome: Progressing  Goal: Maintains/Returns to pre admission functional level  Description: INTERVENTIONS:  - Perform BMAT or MOVE assessment daily    - Set and communicate daily mobility goal to care team and patient/family/caregiver  - Collaborate with rehabilitation services on mobility goals if consulted  - Perform Range of Motion  times a day  - Reposition patient every  hours    - Dangle patient  times a day  - Stand patient  times a day  - Ambulate patient times a day  - Out of bed to chair  times a day   - Out of bed for meals times a day  - Out of bed for toileting  - Record patient progress and toleration of activity level   Outcome: Progressing     Problem: DISCHARGE PLANNING  Goal: Discharge to home or other facility with appropriate resources  Description: INTERVENTIONS:  - Identify barriers to discharge w/patient and caregiver  - Arrange for needed discharge resources and transportation as appropriate  - Identify discharge learning needs (meds, wound care, etc )  - Arrange for interpretive services to assist at discharge as needed  - Refer to Case Management Department for coordinating discharge planning if the patient needs post-hospital services based on physician/advanced practitioner order or complex needs related to functional status, cognitive ability, or social support system  Outcome: Progressing     Problem: BIRTH - VAGINAL/ SECTION  Goal: Fetal and maternal status remain reassuring during the birth process  Description: INTERVENTIONS:  - Monitor vital signs  - Monitor fetal heart rate  - Monitor uterine activity  - Monitor labor progression (vaginal delivery)  - DVT prophylaxis  - Antibiotic prophylaxis  Outcome: Progressing  Goal: Emotionally satisfying birthing experience for mother/fetus  Description: Interventions:  - Assess, plan, implement and evaluate the nursing care given to the patient in labor  - Advocate the philosophy that each childbirth experience is a unique experience and support the family's chosen level of involvement and control during the labor process   - Actively participate in both the patient's and family's teaching of the birth process  - Consider cultural, Catholic and age-specific factors and plan care for the patient in labor  Outcome: Progressing

## 2023-03-07 NOTE — OB LABOR/OXYTOCIN SAFETY PROGRESS
Oxytocin Safety Progress Check Note - Darlyn Moon 21 y o  female MRN: [id number]    Unit/Bed#: -01 Encounter: [id number]    Dose (renetta-units/min) Oxytocin: 4 renetta-units/min  Contraction Frequency (minutes): 2-4  Contraction Quality: Mild  Tachysystole: No   Cervical Dilation: 4        Cervical Effacement: 70  Fetal Station: -2  Baseline Rate: 130 bpm  Fetal Heart Rate: 122 BPM  FHR Category: Category II               Vital Signs:   Vitals:    03/06/23 2338   BP: (!) 149/101   Pulse: 72   Resp:    Temp:    SpO2:        Notes/comments: SVE is unchanged  FHT is CAT 2 with intermittent variables and late decelerations  Patient is feeling contractions pain after pitocin was started and requesting epidural   Plan to recheck patient in 2 hours after epidural     Patient feeling anxious    Consider giving Atarax if still anxious after epidural      Dr Fishman Abt aware           Beltran Haro MD 3/6/2023 11:54 PM

## 2023-03-07 NOTE — OB LABOR/OXYTOCIN SAFETY PROGRESS
Oxytocin Safety Progress Check Note - Charlene Dunbar 21 y o  female MRN: [id number]    Unit/Bed#: -01 Encounter: [id number]    Dose (renetta-units/min) Oxytocin: 0 renetta-units/min  Contraction Frequency (minutes): 2-4  Contraction Quality: Mild  Tachysystole: No   Cervical Dilation: 6        Cervical Effacement: 100  Fetal Station: 0  Baseline Rate: 120 bpm  Fetal Heart Rate: 133 BPM  FHR Category: Category II  Oxytocin Safety Progress Check: Safety check completed            Vital Signs:   Vitals:    23 0414   BP: 112/75   Pulse: 81   Resp:    Temp:    SpO2:        Notes/comments:   FHT notable for recurrent lates  Pitocin has been off since 305  IUPC and FSE placed  SVE as above  Patient is making minimal progress  She still has variability maintained through the tracing and occasional accelerations  Patient has been repositioned and IVF bolus going  Dr Darshan Keller at the bedside as well  Discussed with patient that if we cannot turn on pitocin to get her to contract or baby is still not tolerating labor will need to consider  delivery           Kiana Little MD 3/7/2023 4:33 AM

## 2023-03-07 NOTE — ANESTHESIA PROCEDURE NOTES
Epidural Block    Patient location during procedure: OB  Start time: 3/7/2023 12:11 AM  Reason for block: procedure for pain and at surgeon's request  Staffing  Performed: Anesthesiologist   Anesthesiologist: Eduar Gonzalez MD  Preanesthetic Checklist  Completed: patient identified, IV checked, site marked, risks and benefits discussed, surgical consent, monitors and equipment checked, pre-op evaluation and timeout performed  Epidural  Patient position: sitting  Prep: ChloraPrep  Patient monitoring: cardiac monitor and frequent blood pressure checks  Approach: midline  Location: lumbar  Injection technique: ANTHONY saline  Needle  Needle type: Tuohy   Needle gauge: 18 G  Catheter type: side hole  Catheter size: 20 G  Catheter at skin depth: 13 cm  Catheter securement method: stabilization device  Test dose: negative  Assessment  Sensory level: T10  Number of attempts: 1negative aspiration for CSF, negative aspiration for heme and no paresthesia on injection  patient tolerated the procedure well with no immediate complications  Additional Notes  1 smooth attempt

## 2023-03-07 NOTE — ANESTHESIA PREPROCEDURE EVALUATION
Procedure:  LABOR ANALGESIA    Relevant Problems   CARDIO   (+) Menstrual migraine without status migrainosus, not intractable      GYN   (+) 39 weeks gestation of pregnancy      NEURO/PSYCH   (+) Chronic tension-type headache, not intractable   (+) Menstrual migraine without status migrainosus, not intractable        Recent labs personally reviewed:  Lab Results   Component Value Date    WBC 8 88 03/06/2023    HGB 11 6 03/06/2023     03/06/2023     No results found for: NA, K, BUN, CREATININE, GLUCOSE  No results found for: PTT   No results found for: INR    No results found for: HGBA1C    Type and Screen:  A             Anesthesia Plan  ASA Score- 2     Anesthesia Type- epidural with ASA Monitors  Additional Monitors:   Airway Plan:           Plan Factors-    Chart reviewed  Existing labs reviewed  Induction-     Postoperative Plan-     Informed Consent- Anesthetic plan and risks discussed with patient  I personally reviewed this patient with the CRNA  Discussed and agreed on the Anesthesia Plan with the CRNA  Donna Dennis

## 2023-03-07 NOTE — LACTATION NOTE
This note was copied from a baby's chart  Met with parents to discuss feeding plan  Mother is planning on breastfeeding and baby has latched once in a football position  Mother discussed concerns with carpel tunnel and positioning and offered reassurance in finding more comfortable positioning for next feeding  The Ready, Set, Baby Booklet was discussed  Discussed importance of skin to skin to help baby awaken for breastfeeding, to help with milk production as well as stabilize temperature, blood sugars, decrease pain, promote relaxation, and calm the baby as well as for bonding that father may do as well  Showed images of tummy size progression as milk production increases to meet the nutritional/growing needs of the baby and risks associated with introducing early supplementation that is not medically indicated  Discussed alternative feeding methods as a manner to provide baby with additional colostrum/breast milk if baby is sleepy and/or unable to breastfeed directly to help protect the milk supply and preserve latching abilities at the breast     Discussed “Second Night Syndrome” explaining how baby’s cluster feeds to meet growing needs  Growth spurts were explained and how cluster feeding helps boost milk supply  Explained feeding cues and fullness cues as well as importance of obtaining a deep latch for effective milk removal and proper positioning (tummy to tummy, at level, nose to nipple, bring chin to breast first and bringing baby to breast) with ear, shoulder, and hip alignment  Demonstrated on breast model how to hold, compress and perform hand expression  Addressed breast pump needs and mother discussed that she has a Spectra S2 and Zomee breast pumps for home use  Parents were made aware of how to communicate with lactation and encouraged to reach out for the next feeding and continued support and/or questions that arise

## 2023-03-07 NOTE — OP NOTE
OPERATIVE REPORT  PATIENT NAME: Mila Palm    :  2000  MRN: [id number]  Pt Location: [facility] L&D OR ROOM 02    SURGERY DATE: 3/7/2023    Surgeon(s) and Role:     * Saran Stoddard MD - Primary     * Hermann Graves MD - Assisting    Preop Diagnosis:  Non-reassuring fetal heart tones complicating pregnancy, antepartum [O36 8390]    Post-Op Diagnosis Codes:     * Non-reassuring fetal heart tones complicating pregnancy, antepartum [O36 8390]    Procedure(s) (LRB):   SECTION () (N/A)    Specimen(s):  ID Type Source Tests Collected by Time Destination   1 :  Tissue (Placenta on Hold) OB Only Placenta TISSUE EXAM OB (PLACENTA) ONLY Saran Stoddard MD 3/7/2023 2012    A :  Cord Blood Cord BLOOD GAS, VENOUS, CORD, BLOOD GAS, ARTERIAL, CORD Saran Stoddard MD 3/7/2023 0534        Surgical QBL:  Surgical QBL (mL): 352 mL      Drains:  Urethral Catheter 16 Fr  (Active)   Reasons to continue Urinary Catheter  Post-operative urological requirements 23 05   Goal for Removal Remove after 48 hrs of I/O monitoring 23 05   Site Assessment Clean;Skin intact 23 05   Juan Care Done 23 0045   Collection Container Standard drainage bag 23 05   Securement Method Securing device (Describe) 23 0521   Number of days: 0       Anesthesia Type:   Epidural    Operative Indications:  Non-reassuring fetal heart tones complicating pregnancy, antepartum [O36 8390]       M[employer] Group Classification System:  No Multiple pregnancy, No Transverse or oblique lie, No Breech lie, Gestational age is > or =37 weeks, Nulliparous, Labor induced +  is MOOK GROUP 2a    Operative Findings:  Grossly normal uterus  Grossly normal Fallopian tube and ovary, bilaterally     Complications:   None    Procedure and Technique: In the operating room the correct patient and procedures were identified  Epidural  anesthesia was adequately established   500mg  Azithromycin and 2g of Ancef IV was given for preoperative surgical prophylaxis  Patient was placed in the dorsal supine position with a left tilt of the hips  Fetal heart tones were confirmed to be 125  Chlorhexidene  was used to prep the vagina and perineum  Chloraprep was used to prep the abdomen  She was draped in the usual sterile fashion  Time out was performed with all in agreement  Time out was performed  Pfannenstiel incision was made in the skin with a surgical scalpel and sharp dissection was carried out over subsequent layers of tissue down to the level of the fascia  The fascia was incised at the midline and the incision was extended bilaterally using the curved Villatoro scissors  The superior edge of the fascial incision was grasped with Kocher clamps and dissected off the underlying rectus muscles  The inferior edge of the fascial incision was grasped with Kocher clamps and the underlying rectus muscles dissected off  The rectus muscles were then divided at midline and the peritoneum was identified and entered  The peritoneal incision was extended superiorly and inferiorly  The bladder blade was inserted and the vesicouterine peritoneum was identified  A bladder blade was reinserted and a transverse incision was made in the lower uterine segment using a new surgical blade  Clear amniotic fluid was noted  The uterine incision was extended cephalad and caudal using blunt dissection  The surgeon's hand was placed into the uterine cavity  The fetal head was identified and elevated through the uterine incision with the assistance of fundal pressure  There was no nuchal cord noted  With gentle traction, the shoulders were delivered followed by the rest of the fetal body  Following delayed cord clamping, the umbilical cord was doubly clamped and cut  The infant was then passed off the table to the awaiting  staff  The  was noted to cry spontaneously and moved all extremities   Venous and arterial blood gas, cord blood, and portion of cord was obtained for analysis and routine blood testing  The placenta delivered with uterine massage and was noted to be intact with and had a central  insertion of a three-vessel cord  The placenta was sent to pathology  Oxytocin was administered by IV infusion to enhance uterine contraction  The uterus was exteriorized and cleared of all clots and remaining products of conception  The hysterotomy was reapproximated using 0- vicryl  in a running locked fashion  A second imbricating stitch with  was applied  Hemostasis was achieved  The posterior cul-de-sac was cleared of all clots and products of conception  The uterus was replaced into the abdomen and the pericolic gutters were cleared of all clots  Hemostasis was once again confirmed at the hysterotomy  The fascia was reapproximated using 0-vicryl in a running nonlocked fashion  The subcutaneous tissue was irrigated and cleared of all clots and debris  Good hemostasis was notedwith Bovie electrocautery  The skin incision was closed in a running subcuticular fashion with 4-0 monocryl  Good hemostasis was noted  Telfa and Abdominal pads were applies as dressing The uterus was expressed of clots   Patient tolerated the procedure well  All needle, sponge, and instrument counts were noted to be correct x 2 at the end of the procedure  Patient was transferred to the recovery room in stable condition  Dr Brittaney Landaverde was present for the procedure        Patient Disposition:  PACU         SIGNATURE: Jo Ann Bangura MD  DATE: March 7, 2023  TIME: 7:52 AM

## 2023-03-08 LAB
BASOPHILS # BLD AUTO: 0.02 THOUSANDS/ÂΜL (ref 0–0.1)
BASOPHILS NFR BLD AUTO: 0 % (ref 0–1)
EOSINOPHIL # BLD AUTO: 0.1 THOUSAND/ÂΜL (ref 0–0.61)
EOSINOPHIL NFR BLD AUTO: 1 % (ref 0–6)
ERYTHROCYTE [DISTWIDTH] IN BLOOD BY AUTOMATED COUNT: 14.8 % (ref 11.6–15.1)
HCT VFR BLD AUTO: 28.4 % (ref 34.8–46.1)
HGB BLD-MCNC: 9 G/DL (ref 11.5–15.4)
IMM GRANULOCYTES # BLD AUTO: 0.07 THOUSAND/UL (ref 0–0.2)
IMM GRANULOCYTES NFR BLD AUTO: 1 % (ref 0–2)
LYMPHOCYTES # BLD AUTO: 1.67 THOUSANDS/ÂΜL (ref 0.6–4.47)
LYMPHOCYTES NFR BLD AUTO: 16 % (ref 14–44)
MCH RBC QN AUTO: 27.7 PG (ref 26.8–34.3)
MCHC RBC AUTO-ENTMCNC: 31.7 G/DL (ref 31.4–37.4)
MCV RBC AUTO: 87 FL (ref 82–98)
MONOCYTES # BLD AUTO: 0.75 THOUSAND/ÂΜL (ref 0.17–1.22)
MONOCYTES NFR BLD AUTO: 7 % (ref 4–12)
NEUTROPHILS # BLD AUTO: 7.78 THOUSANDS/ÂΜL (ref 1.85–7.62)
NEUTS SEG NFR BLD AUTO: 75 % (ref 43–75)
NRBC BLD AUTO-RTO: 0 /100 WBCS
PLATELET # BLD AUTO: 176 THOUSANDS/UL (ref 149–390)
PMV BLD AUTO: 9.2 FL (ref 8.9–12.7)
RBC # BLD AUTO: 3.25 MILLION/UL (ref 3.81–5.12)
WBC # BLD AUTO: 10.39 THOUSAND/UL (ref 4.31–10.16)

## 2023-03-08 RX ORDER — OXYCODONE HYDROCHLORIDE 5 MG/1
5 TABLET ORAL EVERY 4 HOURS PRN
Status: DISCONTINUED | OUTPATIENT
Start: 2023-03-08 | End: 2023-03-09 | Stop reason: HOSPADM

## 2023-03-08 RX ORDER — IBUPROFEN 600 MG/1
600 TABLET ORAL EVERY 6 HOURS SCHEDULED
Status: DISCONTINUED | OUTPATIENT
Start: 2023-03-09 | End: 2023-03-09 | Stop reason: HOSPADM

## 2023-03-08 RX ORDER — OXYCODONE HYDROCHLORIDE 10 MG/1
10 TABLET ORAL EVERY 4 HOURS PRN
Status: DISCONTINUED | OUTPATIENT
Start: 2023-03-08 | End: 2023-03-09 | Stop reason: HOSPADM

## 2023-03-08 RX ORDER — KETOROLAC TROMETHAMINE 30 MG/ML
30 INJECTION, SOLUTION INTRAMUSCULAR; INTRAVENOUS EVERY 6 HOURS SCHEDULED
Status: DISCONTINUED | OUTPATIENT
Start: 2023-03-08 | End: 2023-03-08

## 2023-03-08 RX ORDER — IBUPROFEN 600 MG/1
600 TABLET ORAL EVERY 6 HOURS
Status: DISCONTINUED | OUTPATIENT
Start: 2023-03-10 | End: 2023-03-08

## 2023-03-08 RX ADMIN — KETOROLAC TROMETHAMINE 30 MG: 30 INJECTION, SOLUTION INTRAMUSCULAR at 15:43

## 2023-03-08 RX ADMIN — ACETAMINOPHEN 650 MG: 325 TABLET ORAL at 23:51

## 2023-03-08 RX ADMIN — ACETAMINOPHEN 650 MG: 325 TABLET ORAL at 04:39

## 2023-03-08 RX ADMIN — DOCUSATE SODIUM 100 MG: 100 CAPSULE, LIQUID FILLED ORAL at 09:07

## 2023-03-08 RX ADMIN — ACETAMINOPHEN 650 MG: 325 TABLET ORAL at 18:13

## 2023-03-08 RX ADMIN — IRON SUCROSE 200 MG: 20 INJECTION, SOLUTION INTRAVENOUS at 15:44

## 2023-03-08 RX ADMIN — ACETAMINOPHEN 650 MG: 325 TABLET ORAL at 11:10

## 2023-03-08 RX ADMIN — KETOROLAC TROMETHAMINE 30 MG: 30 INJECTION, SOLUTION INTRAMUSCULAR at 00:07

## 2023-03-08 RX ADMIN — ENOXAPARIN SODIUM 40 MG: 40 INJECTION SUBCUTANEOUS at 09:07

## 2023-03-08 RX ADMIN — DOCUSATE SODIUM 100 MG: 100 CAPSULE, LIQUID FILLED ORAL at 18:13

## 2023-03-08 RX ADMIN — KETOROLAC TROMETHAMINE 30 MG: 30 INJECTION, SOLUTION INTRAMUSCULAR at 09:05

## 2023-03-08 NOTE — PLAN OF CARE
Problem: PAIN - ADULT  Goal: Verbalizes/displays adequate comfort level or baseline comfort level  Description: Interventions:  - Encourage patient to monitor pain and request assistance  - Assess pain using appropriate pain scale  - Administer analgesics based on type and severity of pain and evaluate response  - Implement non-pharmacological measures as appropriate and evaluate response  - Consider cultural and social influences on pain and pain management  - Notify physician/advanced practitioner if interventions unsuccessful or patient reports new pain  1/3/5675 0256 by Yun Soares RN  Outcome: Progressing  6/1/4508 3791 by Yun Soares RN  Outcome: Progressing     Problem: INFECTION - ADULT  Goal: Absence or prevention of progression during hospitalization  Description: INTERVENTIONS:  - Assess and monitor for signs and symptoms of infection  - Monitor lab/diagnostic results  - Monitor all insertion sites, i e  indwelling lines, tubes, and drains  - Monitor endotracheal if appropriate and nasal secretions for changes in amount and color  - [place] appropriate cooling/warming therapies per order  - Administer medications as ordered  - Instruct and encourage patient and family to use good hand hygiene technique  - Identify and instruct in appropriate isolation precautions for identified infection/condition  5/0/6395 0869 by Yun Soares RN  Outcome: Progressing  9/5/8595 0456 by Yun Soares RN  Outcome: Progressing  Goal: Absence of fever/infection during neutropenic period  Description: INTERVENTIONS:  - Monitor WBC    7/5/8033 6510 by Yun Soares RN  Outcome: Progressing  9/3/4439 2653 by Yun Soares RN  Outcome: Progressing     Problem: SAFETY ADULT  Goal: Patient will remain free of falls  Description: INTERVENTIONS:  - Educate patient/family on patient safety including physical limitations  - Instruct patient to call for assistance with activity - Consult OT/PT to assist with strengthening/mobility   - Keep Call bell within reach  - Keep bed low and locked with side rails adjusted as appropriate  - Keep care items and personal belongings within reach  - Initiate and maintain comfort rounds  - Make Fall Risk Sign visible to staff  - Offer Toileting every  Hours, in advance of need  - Initiate/Maintain alarm  - Obtain necessary fall risk management equipment:   - Apply yellow socks and bracelet for high fall risk patients  - Consider moving patient to room near nurses station  4/6/6928 1661 by Zaire Steele RN  Outcome: Progressing  9/8/6994 5640 by Zaire Steele RN  Outcome: Progressing  Goal: Maintain or return to baseline ADL function  Description: INTERVENTIONS:  -  Assess patient's ability to carry out ADLs; assess patient's baseline for ADL function and identify physical deficits which impact ability to perform ADLs (bathing, care of mouth/teeth, toileting, grooming, dressing, etc )  - Assess/evaluate cause of self-care deficits   - Assess range of motion  - Assess patient's mobility; develop plan if impaired  - Assess patient's need for assistive devices and provide as appropriate  - Encourage maximum independence but intervene and supervise when necessary  - Involve family in performance of ADLs  - Assess for home care needs following discharge   - Consider OT consult to assist with ADL evaluation and planning for discharge  - Provide patient education as appropriate  6/4/1088 9996 by Zaire Steele RN  Outcome: Progressing  4/4/9505 6195 by Zaire Steele RN  Outcome: Progressing  Goal: Maintains/Returns to pre admission functional level  Description: INTERVENTIONS:  - Perform BMAT or MOVE assessment daily    - Set and communicate daily mobility goal to care team and patient/family/caregiver  - Collaborate with rehabilitation services on mobility goals if consulted  - Perform Range of Motion  times a day    - Reposition patient every  hours    - Dangle patient  times a day  - Stand patient  times a day  - Ambulate patient  times a day  - Out of bed to chair  times a day   - Out of bed for meals  times a day  - Out of bed for toileting  - Record patient progress and toleration of activity level   [phone] by Zaire Steele RN  Outcome: Progressing  6/3/2118 5805 by Zaire Steele RN  Outcome: Progressing     Problem: DISCHARGE PLANNING  Goal: Discharge to home or other facility with appropriate resources  Description: INTERVENTIONS:  - Identify barriers to discharge w/patient and caregiver  - Arrange for needed discharge resources and transportation as appropriate  - Identify discharge learning needs (meds, wound care, etc )  - Arrange for interpretive services to assist at discharge as needed  - Refer to Case Management Department for coordinating discharge planning if the patient needs post-hospital services based on physician/advanced practitioner order or complex needs related to functional status, cognitive ability, or social support system   [phone] by Zaire Steele RN  Outcome: Progressing  [phone] by Zaire Steele RN  Outcome: Progressing     Problem: POSTPARTUM  Goal: Experiences normal postpartum course  Description: INTERVENTIONS:  - Monitor maternal vital signs  - Assess uterine involution and lochia  [phone] by Zaire Steele RN  Outcome: Progressing  [phone] by Zaire Steele RN  Outcome: Progressing  Goal: Appropriate maternal -  bonding  Description: INTERVENTIONS:  - Identify family support  - Assess for appropriate maternal/infant bonding   -Encourage maternal/infant bonding opportunities  - Referral to  or  as needed  [phone] by Zaire Steele RN  Outcome: Progressing  8/3/6324 0572 by Zaire Steele RN  Outcome: Progressing  Goal: Establishment of infant feeding pattern  Description: INTERVENTIONS:  - Assess breast/bottle feeding  - Refer to lactation as needed  3/5/2220 7230 by Lydia Leal RN  Outcome: Progressing  2/4/5746 7271 by Lydia Leal RN  Outcome: Progressing  Goal: Incision(s), wounds(s) or drain site(s) healing without S/S of infection  Description: INTERVENTIONS  - Assess and document dressing, incision, wound bed, drain sites and surrounding tissue  - Provide patient and family education  - Perform skin care/dressing changes every   6/0/5049 6065 by Lydia Leal RN  Outcome: Progressing  2/6/4043 4772 by Lydia Leal RN  Outcome: Progressing

## 2023-03-08 NOTE — PLAN OF CARE
Problem: PAIN - ADULT  Goal: Verbalizes/displays adequate comfort level or baseline comfort level  Description: Interventions:  - Encourage patient to monitor pain and request assistance  - Assess pain using appropriate pain scale  - Administer analgesics based on type and severity of pain and evaluate response  - Implement non-pharmacological measures as appropriate and evaluate response  - Consider cultural and social influences on pain and pain management  - Notify physician/advanced practitioner if interventions unsuccessful or patient reports new pain  Outcome: Progressing     Problem: INFECTION - ADULT  Goal: Absence or prevention of progression during hospitalization  Description: INTERVENTIONS:  - Assess and monitor for signs and symptoms of infection  - Monitor lab/diagnostic results  - Monitor all insertion sites, i e  indwelling lines, tubes, and drains  - Monitor endotracheal if appropriate and nasal secretions for changes in amount and color  - [place] appropriate cooling/warming therapies per order  - Administer medications as ordered  - Instruct and encourage patient and family to use good hand hygiene technique  - Identify and instruct in appropriate isolation precautions for identified infection/condition  Outcome: Progressing  Goal: Absence of fever/infection during neutropenic period  Description: INTERVENTIONS:  - Monitor WBC    Outcome: Progressing     Problem: SAFETY ADULT  Goal: Patient will remain free of falls  Description: INTERVENTIONS:  - Educate patient/family on patient safety including physical limitations  - Instruct patient to call for assistance with activity   - Consult OT/PT to assist with strengthening/mobility   - Keep Call bell within reach  - Keep bed low and locked with side rails adjusted as appropriate  - Keep care items and personal belongings within reach  - Initiate and maintain comfort rounds    Outcome: Progressing  Goal: Maintain or return to baseline ADL function  Description: INTERVENTIONS:  -  Assess patient's ability to carry out ADLs; assess patient's baseline for ADL function and identify physical deficits which impact ability to perform ADLs (bathing, care of mouth/teeth, toileting, grooming, dressing, etc )  - Assess/evaluate cause of self-care deficits   - Assess range of motion  - Assess patient's mobility; develop plan if impaired  - Assess patient's need for assistive devices and provide as appropriate  - Encourage maximum independence but intervene and supervise when necessary  - Involve family in performance of ADLs  - Assess for home care needs following discharge   - Consider OT consult to assist with ADL evaluation and planning for discharge  - Provide patient education as appropriate  Outcome: Progressing  Goal: Maintains/Returns to pre admission functional level  Description: INTERVENTIONS:  - Perform BMAT or MOVE assessment daily    - Set and communicate daily mobility goal to care team and patient/family/caregiver       Outcome: Progressing     Problem: DISCHARGE PLANNING  Goal: Discharge to home or other facility with appropriate resources  Description: INTERVENTIONS:  - Identify barriers to discharge w/patient and caregiver  - Arrange for needed discharge resources and transportation as appropriate  - Identify discharge learning needs (meds, wound care, etc )  - Arrange for interpretive services to assist at discharge as needed  - Refer to Case Management Department for coordinating discharge planning if the patient needs post-hospital services based on physician/advanced practitioner order or complex needs related to functional status, cognitive ability, or social support system  Outcome: Progressing     Problem: POSTPARTUM  Goal: Experiences normal postpartum course  Description: INTERVENTIONS:  - Monitor maternal vital signs  - Assess uterine involution and lochia  Outcome: Progressing  Goal: Appropriate maternal -  bonding  Description: INTERVENTIONS:  - Identify family support  - Assess for appropriate maternal/infant bonding   -Encourage maternal/infant bonding opportunities  - Referral to  or  as needed  Outcome: Progressing  Goal: Establishment of infant feeding pattern  Description: INTERVENTIONS:  - Assess breast/bottle feeding  - Refer to lactation as needed  Outcome: Progressing  Goal: Incision(s), wounds(s) or drain site(s) healing without S/S of infection  Description: INTERVENTIONS  - Assess and document dressing, incision, wound bed, drain sites and surrounding tissue  - Provide patient and family education    Outcome: Progressing

## 2023-03-08 NOTE — ASSESSMENT & PLAN NOTE
, Hgb 11 6 --> 9 0, venofer ordered but unable to administer secondary to IV   PO Iron ordered  Lines: voiding spontaneously   Pain: Tylenol and toradol scheduled, ankit 5/10 PRN    FEN: Tolerating regular diet  DVT ppx: SCDs and Lovenox 40mg qD  Passing flatus   Incision C/D/I

## 2023-03-08 NOTE — PROGRESS NOTES
Progress Note - OB/GYN  Amisha Pinedo 21 y o  female MRN: [id number]  Unit/Bed#: -01 Encounter: [id number]    Assessment and Plan     Amisha Pinedo is a patient of: [facility]  She is POD# 1 s/p 1LTCS  Recovering well and is stable       Gestational hypertension  Assessment & Plan  Systolic (96UAS), JWV:690 , Min:108 , ZYO:121     Diastolic (94NVB), HTL:63, Min:58, Max:85        Diet controlled gestational diabetes mellitus (GDM) in third trimester  Assessment & Plan  No results found for: HGBA1C    Recent Labs     23  1851 23  2049 23  2301 23  0310   POCGLU 89 76 89 85       Blood Sugar Average: Last 72 hrs:    - 1hr gtt at 6 weeks postpartum    *  (spontaneous vaginal delivery)  Assessment & Plan  , Hgb 11 6 --> post op Hgb   Lines: voiding spontaneously   Pain: Tylenol and toradol scheduled, ankit 5/10 PRN    FEN: Tolerating regular diet  DVT ppx: SCDs and Lovenox 40mg qD  Passing flatus   Incision C/D/I             Disposition    - Anticipate discharge home on POD# 2 vs 3      Subjective/Objective     Chief Complaint: Postoperative State     Subjective:    Amisha Pinedo is s/p 1LTCS  She is POD# 1  She is having a headache this morning, but tylenol has provided some relief  It feels like one of her chronic headaches  Pain is well controlled  Patient is currently voiding  She is ambulating  Patient is currently passing flatus and has had no bowel movement  She is tolerating PO, and denies nausea or vomitting  Patient denies fever, chills, chest pain, shortness of breath, or calf tenderness  Lochia is normal  She is  Breastfeeding  Her incision is C/D/I  She is recovering well and is stable         Vitals:   /69 (BP Location: Right arm)   Pulse 82   Temp 98 4 °F (36 9 °C) (Oral)   Resp 18   Ht 5' 2" (1 575 m)   Wt 89 8 kg (198 lb)   LMP 2022   SpO2 96%   Breastfeeding Yes   BMI 36 21 kg/m²       Intake/Output Summary (Last 24 hours) at 3/8/2023 0511  Last data filed at 3/8/2023 0200  Gross per 24 hour   Intake 480 ml   Output 1827 ml   Net -1347 ml       Invasive Devices     Peripheral Intravenous Line  Duration           Peripheral IV 03/06/23 Dorsal (posterior); Left Hand 1 day                Physical Exam:   GEN: Gwenevere Mae appears well, alert, pleasant and cooperative   CARDIO: RRR  RESP:  Normal respiratory effort  ABDOMEN: soft, no tenderness, no distention, fundus firm, Incision C/D/I  EXTREMITIES: SCDs on, Negative Caleb's sign bilaterally      Labs:     Hemoglobin   Date Value Ref Range Status   03/06/2023 11 6 11 5 - 15 4 g/dL Final   12/01/2022 11 6 11 5 - 15 4 g/dL Final     WBC   Date Value Ref Range Status   03/06/2023 8 88 4 31 - 10 16 Thousand/uL Final   12/01/2022 9 36 4 31 - 10 16 Thousand/uL Final     Platelets   Date Value Ref Range Status   03/06/2023 261 149 - 390 Thousands/uL Final   12/01/2022 227 149 - 390 Thousands/uL Final     Creatinine   Date Value Ref Range Status   03/07/2023 0 59 (L) 0 60 - 1 30 mg/dL Final     Comment:     Standardized to IDMS reference method   03/06/2023 0 63 0 60 - 1 30 mg/dL Final     Comment:     Standardized to IDMS reference method     AST   Date Value Ref Range Status   03/07/2023 16 13 - 39 U/L Final     Comment:     Specimen collection should occur prior to Sulfasalazine administration due to the potential for falsely depressed results  03/06/2023 18 13 - 39 U/L Final     Comment:     Specimen collection should occur prior to Sulfasalazine administration due to the potential for falsely depressed results  ALT   Date Value Ref Range Status   03/07/2023 10 7 - 52 U/L Final     Comment:     Specimen collection should occur prior to Sulfasalazine administration due to the potential for falsely depressed results      03/06/2023 11 7 - 52 U/L Final     Comment:     Specimen collection should occur prior to Sulfasalazine administration due to the potential for falsely depressed results             Tram Toledo MD  OBGYN PGY1  3/8/2023  5:11 AM

## 2023-03-09 ENCOUNTER — TELEPHONE (OUTPATIENT)
Dept: PERINATAL CARE | Facility: CLINIC | Age: 23
End: 2023-03-09

## 2023-03-09 ENCOUNTER — PATIENT MESSAGE (OUTPATIENT)
Dept: PERINATAL CARE | Facility: CLINIC | Age: 23
End: 2023-03-09

## 2023-03-09 VITALS
BODY MASS INDEX: 36.44 KG/M2 | DIASTOLIC BLOOD PRESSURE: 94 MMHG | OXYGEN SATURATION: 97 % | HEIGHT: 62 IN | RESPIRATION RATE: 18 BRPM | TEMPERATURE: 98.5 F | WEIGHT: 198 LBS | SYSTOLIC BLOOD PRESSURE: 129 MMHG | HEART RATE: 77 BPM

## 2023-03-09 DIAGNOSIS — Z13.1 DIABETES MELLITUS SCREENING: Primary | ICD-10-CM

## 2023-03-09 DIAGNOSIS — Z86.32 HISTORY OF GESTATIONAL DIABETES MELLITUS, NOT CURRENTLY PREGNANT: ICD-10-CM

## 2023-03-09 RX ORDER — DOCUSATE SODIUM 100 MG/1
100 CAPSULE, LIQUID FILLED ORAL 2 TIMES DAILY PRN
Refills: 0
Start: 2023-03-09

## 2023-03-09 RX ORDER — OXYCODONE HYDROCHLORIDE 5 MG/1
5 TABLET ORAL EVERY 4 HOURS PRN
Qty: 7 TABLET | Refills: 0 | Status: CANCELLED | OUTPATIENT
Start: 2023-03-09 | End: 2023-03-19

## 2023-03-09 RX ORDER — IBUPROFEN 600 MG/1
600 TABLET ORAL EVERY 6 HOURS PRN
Qty: 30 TABLET | Refills: 0
Start: 2023-03-09

## 2023-03-09 RX ORDER — ACETAMINOPHEN 325 MG/1
650 TABLET ORAL EVERY 4 HOURS PRN
Refills: 0
Start: 2023-03-09

## 2023-03-09 RX ORDER — FERROUS SULFATE 325(65) MG
325 TABLET ORAL
Status: DISCONTINUED | OUTPATIENT
Start: 2023-03-09 | End: 2023-03-09 | Stop reason: HOSPADM

## 2023-03-09 RX ADMIN — ACETAMINOPHEN 650 MG: 325 TABLET ORAL at 05:47

## 2023-03-09 RX ADMIN — IBUPROFEN 600 MG: 600 TABLET, FILM COATED ORAL at 02:45

## 2023-03-09 RX ADMIN — ENOXAPARIN SODIUM 40 MG: 40 INJECTION SUBCUTANEOUS at 08:49

## 2023-03-09 RX ADMIN — FERROUS SULFATE TAB 325 MG (65 MG ELEMENTAL FE) 325 MG: 325 (65 FE) TAB at 08:47

## 2023-03-09 RX ADMIN — IBUPROFEN 600 MG: 600 TABLET, FILM COATED ORAL at 08:47

## 2023-03-09 RX ADMIN — DOCUSATE SODIUM 100 MG: 100 CAPSULE, LIQUID FILLED ORAL at 08:47

## 2023-03-09 NOTE — PLAN OF CARE
Problem: PAIN - ADULT  Goal: Verbalizes/displays adequate comfort level or baseline comfort level  Description: Interventions:  - Encourage patient to monitor pain and request assistance  - Assess pain using appropriate pain scale  - Administer analgesics based on type and severity of pain and evaluate response  - Implement non-pharmacological measures as appropriate and evaluate response  - Consider cultural and social influences on pain and pain management  - Notify physician/advanced practitioner if interventions unsuccessful or patient reports new pain  3/9/2023 1420 by Anjana Ferris  Outcome: Adequate for Discharge  3/9/2023 1055 by Anjana Ferris  Outcome: Progressing     Problem: INFECTION - ADULT  Goal: Absence or prevention of progression during hospitalization  Description: INTERVENTIONS:  - Assess and monitor for signs and symptoms of infection  - Monitor lab/diagnostic results  - Monitor all insertion sites, i e  indwelling lines, tubes, and drains  - Monitor endotracheal if appropriate and nasal secretions for changes in amount and color  - [place] appropriate cooling/warming therapies per order  - Administer medications as ordered  - Instruct and encourage patient and family to use good hand hygiene technique  - Identify and instruct in appropriate isolation precautions for identified infection/condition  3/9/2023 1420 by Anjana Ferris  Outcome: Adequate for Discharge  3/9/2023 1055 by Anjana Ferris  Outcome: Progressing  Goal: Absence of fever/infection during neutropenic period  Description: INTERVENTIONS:  - Monitor WBC    3/9/2023 1420 by Anjana Ferris  Outcome: Adequate for Discharge  3/9/2023 1055 by Anjana Ferris  Outcome: Progressing     Problem: SAFETY ADULT  Goal: Patient will remain free of falls  Description: INTERVENTIONS:  - Educate patient/family on patient safety including physical limitations  - Instruct patient to call for assistance with activity   - Consult OT/PT to assist with strengthening/mobility   - Keep Call bell within reach  - Keep bed low and locked with side rails adjusted as appropriate  - Keep care items and personal belongings within reach  - Initiate and maintain comfort rounds  - Make Fall Risk Sign visible to staff  - Offer Toileting every  Hours, in advance of need  - Initiate/Maintain alarm  - Obtain necessary fall risk management equipment:   - Apply yellow socks and bracelet for high fall risk patients  - Consider moving patient to room near nurses station  3/9/2023 1420 by Juni Mauricio  Outcome: Adequate for Discharge  3/9/2023 1055 by Juni Mauricio  Outcome: Progressing  Goal: Maintain or return to baseline ADL function  Description: INTERVENTIONS:  -  Assess patient's ability to carry out ADLs; assess patient's baseline for ADL function and identify physical deficits which impact ability to perform ADLs (bathing, care of mouth/teeth, toileting, grooming, dressing, etc )  - Assess/evaluate cause of self-care deficits   - Assess range of motion  - Assess patient's mobility; develop plan if impaired  - Assess patient's need for assistive devices and provide as appropriate  - Encourage maximum independence but intervene and supervise when necessary  - Involve family in performance of ADLs  - Assess for home care needs following discharge   - Consider OT consult to assist with ADL evaluation and planning for discharge  - Provide patient education as appropriate  3/9/2023 1420 by Juni Mauricio  Outcome: Adequate for Discharge  3/9/2023 1055 by Juni Mauricio  Outcome: Progressing  Goal: Maintains/Returns to pre admission functional level  Description: INTERVENTIONS:  - Perform BMAT or MOVE assessment daily    - Set and communicate daily mobility goal to care team and patient/family/caregiver     - Collaborate with rehabilitation services on mobility goals if consulted  - Perform Range of Motion  times a day   - Reposition patient every  hours    - Dangle patient  times a day  - Stand patient  times a day  - Ambulate patient  times a day  - Out of bed to chair  times a day   - Out of bed for meals  times a day  - Out of bed for toileting  - Record patient progress and toleration of activity level   3/9/2023 1420 by Saskia Ansari  Outcome: Adequate for Discharge  3/9/2023 1055 by Saskia Ansari  Outcome: Progressing     Problem: DISCHARGE PLANNING  Goal: Discharge to home or other facility with appropriate resources  Description: INTERVENTIONS:  - Identify barriers to discharge w/patient and caregiver  - Arrange for needed discharge resources and transportation as appropriate  - Identify discharge learning needs (meds, wound care, etc )  - Arrange for interpretive services to assist at discharge as needed  - Refer to Case Management Department for coordinating discharge planning if the patient needs post-hospital services based on physician/advanced practitioner order or complex needs related to functional status, cognitive ability, or social support system  3/9/2023 1420 by Saskia Ansari  Outcome: Adequate for Discharge  3/9/2023 1055 by Saskia Ansari  Outcome: Progressing     Problem: POSTPARTUM  Goal: Experiences normal postpartum course  Description: INTERVENTIONS:  - Monitor maternal vital signs  - Assess uterine involution and lochia  3/9/2023 1420 by Saskia Ansari  Outcome: Adequate for Discharge  3/9/2023 1055 by Saskia Ansari  Outcome: Progressing  Goal: Appropriate maternal -  bonding  Description: INTERVENTIONS:  - Identify family support  - Assess for appropriate maternal/infant bonding   -Encourage maternal/infant bonding opportunities  - Referral to  or  as needed  3/9/2023 1420 by Saskia Ansari  Outcome: Adequate for Discharge  3/9/2023 1055 by Saskia Ansari  Outcome: Progressing  Goal: Establishment of infant feeding pattern  Description: INTERVENTIONS:  - Assess breast/bottle feeding  - Refer to lactation as needed  3/9/2023 1420 by Naomi Chairez  Outcome: Adequate for Discharge  3/9/2023 1055 by Naomi Chairez  Outcome: Progressing  Goal: Incision(s), wounds(s) or drain site(s) healing without S/S of infection  Description: INTERVENTIONS  - Assess and document dressing, incision, wound bed, drain sites and surrounding tissue  - Provide patient and family education  - Perform skin care/dressing changes every  3/9/2023 1420 by Naomi Chairez  Outcome: Adequate for Discharge  3/9/2023 1055 by Naomi Chairez  Outcome: Progressing

## 2023-03-09 NOTE — PLAN OF CARE
Problem: PAIN - ADULT  Goal: Verbalizes/displays adequate comfort level or baseline comfort level  Description: Interventions:  - Encourage patient to monitor pain and request assistance  - Assess pain using appropriate pain scale  - Administer analgesics based on type and severity of pain and evaluate response  - Implement non-pharmacological measures as appropriate and evaluate response  - Consider cultural and social influences on pain and pain management  - Notify physician/advanced practitioner if interventions unsuccessful or patient reports new pain  Outcome: Progressing     Problem: INFECTION - ADULT  Goal: Absence or prevention of progression during hospitalization  Description: INTERVENTIONS:  - Assess and monitor for signs and symptoms of infection  - Monitor lab/diagnostic results  - Monitor all insertion sites, i e  indwelling lines, tubes, and drains  - Monitor endotracheal if appropriate and nasal secretions for changes in amount and color  - [place] appropriate cooling/warming therapies per order  - Administer medications as ordered  - Instruct and encourage patient and family to use good hand hygiene technique  - Identify and instruct in appropriate isolation precautions for identified infection/condition  Outcome: Progressing  Goal: Absence of fever/infection during neutropenic period  Description: INTERVENTIONS:  - Monitor WBC    Outcome: Progressing     Problem: SAFETY ADULT  Goal: Patient will remain free of falls  Description: INTERVENTIONS:  - Educate patient/family on patient safety including physical limitations  - Instruct patient to call for assistance with activity   - Consult OT/PT to assist with strengthening/mobility   - Keep Call bell within reach  - Keep bed low and locked with side rails adjusted as appropriate  - Keep care items and personal belongings within reach  - Initiate and maintain comfort rounds  - Make Fall Risk Sign visible to staff  - Offer Toileting every  Hours, in advance of need  - Initiate/Maintain alarm  - Obtain necessary fall risk management equipment:   - Apply yellow socks and bracelet for high fall risk patients  - Consider moving patient to room near nurses station  Outcome: Progressing  Goal: Maintain or return to baseline ADL function  Description: INTERVENTIONS:  -  Assess patient's ability to carry out ADLs; assess patient's baseline for ADL function and identify physical deficits which impact ability to perform ADLs (bathing, care of mouth/teeth, toileting, grooming, dressing, etc )  - Assess/evaluate cause of self-care deficits   - Assess range of motion  - Assess patient's mobility; develop plan if impaired  - Assess patient's need for assistive devices and provide as appropriate  - Encourage maximum independence but intervene and supervise when necessary  - Involve family in performance of ADLs  - Assess for home care needs following discharge   - Consider OT consult to assist with ADL evaluation and planning for discharge  - Provide patient education as appropriate  Outcome: Progressing  Goal: Maintains/Returns to pre admission functional level  Description: INTERVENTIONS:  - Perform BMAT or MOVE assessment daily    - Set and communicate daily mobility goal to care team and patient/family/caregiver  - Collaborate with rehabilitation services on mobility goals if consulted  - Perform Range of Motion  times a day  - Reposition patient every  hours    - Dangle patient  times a day  - Stand patient  times a day  - Ambulate patient  times a day  - Out of bed to chair  times a day   - Out of bed for meals  times a day  - Out of bed for toileting  - Record patient progress and toleration of activity level   Outcome: Progressing     Problem: DISCHARGE PLANNING  Goal: Discharge to home or other facility with appropriate resources  Description: INTERVENTIONS:  - Identify barriers to discharge w/patient and caregiver  - Arrange for needed discharge resources and transportation as appropriate  - Identify discharge learning needs (meds, wound care, etc )  - Arrange for interpretive services to assist at discharge as needed  - Refer to Case Management Department for coordinating discharge planning if the patient needs post-hospital services based on physician/advanced practitioner order or complex needs related to functional status, cognitive ability, or social support system  Outcome: Progressing     Problem: POSTPARTUM  Goal: Experiences normal postpartum course  Description: INTERVENTIONS:  - Monitor maternal vital signs  - Assess uterine involution and lochia  Outcome: Progressing  Goal: Appropriate maternal -  bonding  Description: INTERVENTIONS:  - Identify family support  - Assess for appropriate maternal/infant bonding   -Encourage maternal/infant bonding opportunities  - Referral to  or  as needed  Outcome: Progressing  Goal: Establishment of infant feeding pattern  Description: INTERVENTIONS:  - Assess breast/bottle feeding  - Refer to lactation as needed  Outcome: Progressing  Goal: Incision(s), wounds(s) or drain site(s) healing without S/S of infection  Description: INTERVENTIONS  - Assess and document dressing, incision, wound bed, drain sites and surrounding tissue  - Provide patient and family education  - Perform skin care/dressing changes every   Outcome: Progressing

## 2023-03-09 NOTE — PLAN OF CARE
Problem: PAIN - ADULT  Goal: Verbalizes/displays adequate comfort level or baseline comfort level  Description: Interventions:  - Encourage patient to monitor pain and request assistance  - Assess pain using appropriate pain scale  - Administer analgesics based on type and severity of pain and evaluate response  - Implement non-pharmacological measures as appropriate and evaluate response  - Consider cultural and social influences on pain and pain management  - Notify physician/advanced practitioner if interventions unsuccessful or patient reports new pain  Outcome: Progressing     Problem: INFECTION - ADULT  Goal: Absence or prevention of progression during hospitalization  Description: INTERVENTIONS:  - Assess and monitor for signs and symptoms of infection  - Monitor lab/diagnostic results  - Monitor all insertion sites, i e  indwelling lines, tubes, and drains  - Monitor endotracheal if appropriate and nasal secretions for changes in amount and color  - [place] appropriate cooling/warming therapies per order  - Administer medications as ordered  - Instruct and encourage patient and family to use good hand hygiene technique  - Identify and instruct in appropriate isolation precautions for identified infection/condition  Outcome: Progressing  Goal: Absence of fever/infection during neutropenic period  Description: INTERVENTIONS:  - Monitor WBC    Outcome: Progressing     Problem: SAFETY ADULT  Goal: Patient will remain free of falls  Description: INTERVENTIONS:  - Educate patient/family on patient safety including physical limitations  - Instruct patient to call for assistance with activity   - Consult OT/PT to assist with strengthening/mobility   - Keep Call bell within reach  - Keep bed low and locked with side rails adjusted as appropriate  - Keep care items and personal belongings within reach  - Initiate and maintain comfort rounds  - Make Fall Risk Sign visible to staff  - Offer Toileting every  Hours, in advance of need  - Initiate/Maintain alarm  - Obtain necessary fall risk management equipment:   - Apply yellow socks and bracelet for high fall risk patients  - Consider moving patient to room near nurses station  Outcome: Progressing  Goal: Maintain or return to baseline ADL function  Description: INTERVENTIONS:  -  Assess patient's ability to carry out ADLs; assess patient's baseline for ADL function and identify physical deficits which impact ability to perform ADLs (bathing, care of mouth/teeth, toileting, grooming, dressing, etc )  - Assess/evaluate cause of self-care deficits   - Assess range of motion  - Assess patient's mobility; develop plan if impaired  - Assess patient's need for assistive devices and provide as appropriate  - Encourage maximum independence but intervene and supervise when necessary  - Involve family in performance of ADLs  - Assess for home care needs following discharge   - Consider OT consult to assist with ADL evaluation and planning for discharge  - Provide patient education as appropriate  Outcome: Progressing  Goal: Maintains/Returns to pre admission functional level  Description: INTERVENTIONS:  - Perform BMAT or MOVE assessment daily    - Set and communicate daily mobility goal to care team and patient/family/caregiver  - Collaborate with rehabilitation services on mobility goals if consulted  - Perform Range of Motion  times a day  - Reposition patient every  hours    - Dangle patient  times a day  - Stand patient  times a day  - Ambulate patient  times a day  - Out of bed to chair  times a day   - Out of bed for meals  times a day  - Out of bed for toileting  - Record patient progress and toleration of activity level   Outcome: Progressing     Problem: DISCHARGE PLANNING  Goal: Discharge to home or other facility with appropriate resources  Description: INTERVENTIONS:  - Identify barriers to discharge w/patient and caregiver  - Arrange for needed discharge resources and transportation as appropriate  - Identify discharge learning needs (meds, wound care, etc )  - Arrange for interpretive services to assist at discharge as needed  - Refer to Case Management Department for coordinating discharge planning if the patient needs post-hospital services based on physician/advanced practitioner order or complex needs related to functional status, cognitive ability, or social support system  Outcome: Progressing     Problem: POSTPARTUM  Goal: Experiences normal postpartum course  Description: INTERVENTIONS:  - Monitor maternal vital signs  - Assess uterine involution and lochia  Outcome: Progressing  Goal: Appropriate maternal -  bonding  Description: INTERVENTIONS:  - Identify family support  - Assess for appropriate maternal/infant bonding   -Encourage maternal/infant bonding opportunities  - Referral to  or  as needed  Outcome: Progressing  Goal: Establishment of infant feeding pattern  Description: INTERVENTIONS:  - Assess breast/bottle feeding  - Refer to lactation as needed  Outcome: Progressing  Goal: Incision(s), wounds(s) or drain site(s) healing without S/S of infection  Description: INTERVENTIONS  - Assess and document dressing, incision, wound bed, drain sites and surrounding tissue  - Provide patient and family education  - Perform skin care/dressing changes every   Outcome: Progressing

## 2023-03-09 NOTE — LACTATION NOTE
This note was copied from a baby's chart  Discharge Lactation: mom states baby is feeding well  Mom states she has been expressing colostrum since 37 weeks  Enc  To feed the baby  Expressed milk after feedings  Enc  To allow baby to est  Supply with non-nutritive suck  Review of paced bottle feeding, cluster feeding, growth spurts, and est  Supply  Mom complains of right nipple pain after latching  Ed  On wet wound care  Ed  On other tools including silverettes and when to use  Enc  To call lactation outpatient for additional assistance as mom states she doesn't want a baby and me appt at this time  Milk Supply:   - Allow for non-nutritive suck at the breast to stimulate supply   - Allow for skin to skin during and after each breastfeeding session   - Use massage, heat, and hand expression prior to feedings to assist with deep latch   - Increase pumping sessions and pump after every feeding    Education and information provided about non-nutritive suck, role of colostrum, and benefits of skin to skin  Education on non-nutritive suck, how the use of pacifier affecting feeds, shallow latch due to pacifier use, and signs of satiation on the breast  Encouraged offering both breasts at least once, up to two times in a feeding session  Feed expressed milk or formula as needed/desired  Paced bottle feeding technique is less stressful for your baby, prevents overfeeding and protects the breastfeeding relationship  You can find a video about paced bottle feeding at www lacted  org    To help your nipples heal, in addition to paying close attention to latch and positioning, apply protective ointment after feeding or pumping and cover with an occlusive dressing  Discussed 2nd night syndrome and ways to calm infant  Hand out given  Information on hand expression given   Discussed benefits of knowing how to manually express breast including stimulating milk supply, softening nipple for latch and evacuating breast in the event of engorgement  Provided education on growth spurts, when to introduce bottles; paced bottle feeding, and non-nutritive suck at the breast  Provided education on Signs of satiation  Encouraged to call lactation to observe a latch prior to discharge for reassurance  Encouraged to call baby and me with any questions and closely monitor output

## 2023-03-09 NOTE — PROGRESS NOTES
Progress Note - OB/GYN  Carol Pang 21 y o  female MRN: [id number]  Unit/Bed#:  309-01 Encounter: [id number]    Assessment and Plan     Carol Pang is a patient of: [facility]  She is POD# 2 s/p 1LTCS  Recovering well and is stable       Status post primary low transverse  section  Assessment & Plan  , Hgb 11 6 --> 9 0, venofer ordered but unable to administer secondary to IV  PO Iron ordered  Lines: voiding spontaneously   Pain: Tylenol and toradol scheduled, ankit 5/10 PRN    FEN: Tolerating regular diet  DVT ppx: SCDs and Lovenox 40mg qD  Passing flatus   Incision C/D/I     Gestational hypertension  Assessment & Plan  Systolic (02ODW), FARA:976 , Min:121 , HJM:139     Diastolic (95PYW), RYN:45, Min:68, Max:82    CBC/CMP wnl, PCr undetectable    Diet controlled gestational diabetes mellitus (GDM) in third trimester  Assessment & Plan  - 1hr gtt at 6 weeks postpartum    Menstrual migraine without status migrainosus, not intractable  Assessment & Plan  Was on amitriptyline prior to pregnancy, interested in restarting postpartum      Disposition    - Anticipate discharge home on POD# 2 vs 3      Subjective/Objective     Chief Complaint: Postoperative State     Subjective:    Carol Pang is s/p 1LTCS  She is POD# 2  She does not have any headache currently, but would like to restart amitriptyline for migraine prophylaxis  Pain is well controlled  Patient is currently voiding  She is ambulating  Patient is currently passing flatus and has had no bowel movement  She is tolerating PO, and denies nausea or vomitting  Patient denies fever, chills, chest pain, shortness of breath, or calf tenderness  Lochia is normal  She is  Breastfeeding  Her incision is C/D/I  She is recovering well and is stable         Vitals:   /74 (BP Location: Left arm)   Pulse 81   Temp 97 8 °F (36 6 °C) (Oral)   Resp 18   Ht 5' 2" (1 575 m)   Wt 89 8 kg (198 lb)   LMP 2022 SpO2 97%   Breastfeeding Yes   BMI 36 21 kg/m²       Intake/Output Summary (Last 24 hours) at 3/9/2023 0750  Last data filed at 3/8/2023 0830  Gross per 24 hour   Intake --   Output 800 ml   Net -800 ml       Invasive Devices     None                 Physical Exam:   GEN: Kevin Marley appears well, alert, pleasant and cooperative   CARDIO: RRR  RESP:  Normal respiratory effort  ABDOMEN: soft, no tenderness, no distention, fundus firm, Incision C/D/I  EXTREMITIES: SCDs on, Negative Caleb's sign bilaterally      Labs:     Hemoglobin   Date Value Ref Range Status   03/08/2023 9 0 (L) 11 5 - 15 4 g/dL Final   03/06/2023 11 6 11 5 - 15 4 g/dL Final     WBC   Date Value Ref Range Status   03/08/2023 10 39 (H) 4 31 - 10 16 Thousand/uL Final   03/06/2023 8 88 4 31 - 10 16 Thousand/uL Final     Platelets   Date Value Ref Range Status   03/08/2023 176 149 - 390 Thousands/uL Final     Comment:     Results verified by repeatManual Review of Smear Performed   03/06/2023 261 149 - 390 Thousands/uL Final     Creatinine   Date Value Ref Range Status   03/07/2023 0 59 (L) 0 60 - 1 30 mg/dL Final     Comment:     Standardized to IDMS reference method   03/06/2023 0 63 0 60 - 1 30 mg/dL Final     Comment:     Standardized to IDMS reference method     AST   Date Value Ref Range Status   03/07/2023 16 13 - 39 U/L Final     Comment:     Specimen collection should occur prior to Sulfasalazine administration due to the potential for falsely depressed results  03/06/2023 18 13 - 39 U/L Final     Comment:     Specimen collection should occur prior to Sulfasalazine administration due to the potential for falsely depressed results  ALT   Date Value Ref Range Status   03/07/2023 10 7 - 52 U/L Final     Comment:     Specimen collection should occur prior to Sulfasalazine administration due to the potential for falsely depressed results      03/06/2023 11 7 - 52 U/L Final     Comment:     Specimen collection should occur prior to Sulfasalazine administration due to the potential for falsely depressed results             MD MABEL Smith PGY1  3/9/2023  7:50 AM

## 2023-03-10 NOTE — UTILIZATION REVIEW
NOTIFICATION OF INPATIENT ADMISSION   MATERNITY/DELIVERY AUTHORIZATION REQUEST   SERVICING FACILITY:   [facility], [facility] - L&D, , NICU  [address]  Tax ID: [id number]  NPI: [id number]   ATTENDING PROVIDER:  Attending Name and NPI#: Annette Perry Md [[id number]]  Address: [address] [place] [address] [place] [address]  Phone: [phone]   ADMISSION INFORMATION:  Place of Service: Inpatient [address] [facility]  [address]  Place of Service Code: 21  Inpatient Admission Date/Time: 3/6/23  2:47 PM  Discharge Date/Time: 3/9/2023  3:12 PM  Admitting Diagnosis Code/Description:  Encounter for induction of labor [Z34 90]  Encounter for  delivery without indication [O82]     Mother: Rubin Chau 2000 Estimated Date of Delivery: 3/7/23  Delivering clinician: Loki Alford    OB History        1    Para   1    Term   1            AB        Living   1       SAB        IAB        Ectopic        Multiple   0    Live Births   1                Name & MRN:   Information for the patient's :  Eugenia Campbell Diss) [[id number]]      Delivery Information:  Sex: female  Delivered 3/7/2023 5:33 AM by , Low Transverse; Gestational Age: 37w0d     Measurements:  Weight: 6 lb 9 6 oz (2995 g); Height: 20"    APGAR 1 minute 5 minutes 10 minutes   Totals: 9 9      [place] Birth Information: 21 y o  female MRN: [id number] Unit/Bed#: -01   Birthweight: No birth weight on file  Gestational Age: <None> Delivery Type:    APGARS Totals:        UTILIZATION REVIEW CONTACT:  Kevin Harris Utilization   Network Utilization Review Department  Phone: [phone]  Fax [phone]  Email: [email or web address]  Contact for approvals/pending authorizations, clinical reviews, and discharge       PHYSICIAN ADVISORY SERVICES:  Medical Necessity Denial & [employer]my[employer] Review  Phone: [phone] Fax: [phone]  Email: [email or web address]         NOTIFICATION OF ADMISSION DISCHARGE   This is a Notification of Discharge from [address]  Please be advised that this patient has been discharge from our facility  Below you will find the admission and discharge date and time including the patient’s disposition  UTILIZATION REVIEW CONTACT:  Varinder Bush  Utilization   Network Utilization Review Department  Phone: [phone] x carefully listen to the prompts  All voicemails are confidential   Email: [email or web address]     ADMISSION INFORMATION  PRESENTATION DATE: 3/6/2023  2:47 PM  OBERVATION ADMISSION DATE:   INPATIENT ADMISSION DATE: 3/6/23  2:47 PM   DISCHARGE DATE: 3/9/2023  3:12 PM   DISPOSITION:Home/Self Care    IMPORTANT INFORMATION:  Send all requests for admission clinical reviews, approved or denied determinations and any other requests to dedicated fax number below belonging to the campus where the patient is receiving treatment   List of dedicated fax numbers:  [address] DENIALS (Administrative/Medical Necessity) [phone]   [address] (Maternity/NICU/Pediatrics) [phone]   [facility] [phone]   [place] [address] [phone]   [address] [phone]   [address] [facility] [phone]   [address] [place] [address] [phone]   [facility] [place] [address] [phone]   [facility]  [phone]   [facility] [phone]   [address] [place] [address] 850 [facility] [phone]

## 2023-03-14 ENCOUNTER — POSTPARTUM VISIT (OUTPATIENT)
Dept: OBGYN CLINIC | Facility: CLINIC | Age: 23
End: 2023-03-14

## 2023-03-14 VITALS
DIASTOLIC BLOOD PRESSURE: 82 MMHG | BODY MASS INDEX: 33.86 KG/M2 | WEIGHT: 184 LBS | SYSTOLIC BLOOD PRESSURE: 116 MMHG | HEIGHT: 62 IN

## 2023-03-14 DIAGNOSIS — Z48.89 ENCOUNTER FOR POSTOPERATIVE CARE: Primary | ICD-10-CM

## 2023-03-14 PROBLEM — O13.9 GESTATIONAL HYPERTENSION: Status: RESOLVED | Noted: 2023-03-07 | Resolved: 2023-03-14

## 2023-03-14 PROBLEM — O99.213 OBESITY AFFECTING PREGNANCY IN THIRD TRIMESTER: Status: RESOLVED | Noted: 2023-02-09 | Resolved: 2023-03-14

## 2023-03-14 PROBLEM — Z3A.39 39 WEEKS GESTATION OF PREGNANCY: Status: RESOLVED | Noted: 2022-12-02 | Resolved: 2023-03-14

## 2023-03-14 PROBLEM — O24.410 DIET CONTROLLED GESTATIONAL DIABETES MELLITUS (GDM) IN THIRD TRIMESTER: Status: RESOLVED | Noted: 2022-12-22 | Resolved: 2023-03-14

## 2023-03-14 NOTE — PROGRESS NOTES
Assessment/Plan     Normal postpartum exam      1  Contraception: none  2  Annual exam due in September  3  Lactation consult, [address] information discussed  4  Increase activity as tolerated, may resume all normal activity at 6 weeks postpartum  5  Anticipated return to work: 6 - 12 weeks post partum  6  Reviewed postpartum / post op restrictions, follow up in 4-5 weeks  Subjective     Malika Goodwin is a 21 y o  female who presents for a postpartum visit  She is 1 week postpartum following a primary  section, low transverse incision  Indication was for non-reassuring fetal tracing, fetal intolerance to labor  I have fully reviewed the prenatal and intrapartum course  The delivery was at 39 6 gestational weeks  Anesthesia: epidural  Laceration: n/a  Bleeding thin lochia  Bowel function is normal  Bladder function is normal  Patient has not traveled outside the [place]  within the last 14 days  been sexually active  Desired contraception method is none  Baby's course has been unremarkable  Baby is feeding by breast and pumping       Gestational Diabetes: yes  Pregnancy Complications: gestational DM    The following portions of the patient's history were reviewed and updated as appropriate: allergies, past family history, past social history and problem list       Current Outpatient Medications:   •  acetaminophen (TYLENOL) 325 mg tablet, Take 2 tablets (650 mg total) by mouth every 4 (four) hours as needed for mild pain, Disp: , Rfl: 0  •  diphenhydrAMINE (BENADRYL) 25 mg tablet, Take 25 mg by mouth every 6 (six) hours as needed for itching, Disp: , Rfl:   •  docusate sodium (COLACE) 100 mg capsule, Take 1 capsule (100 mg total) by mouth 2 (two) times a day as needed for constipation, Disp: , Rfl: 0  •  ibuprofen (MOTRIN) 600 mg tablet, Take 1 tablet (600 mg total) by mouth every 6 (six) hours as needed for mild pain, Disp: 30 tablet, Rfl: 0  •  ondansetron (Zofran ODT) 8 mg disintegrating tablet, Take 1 tablet (8 mg total) by mouth every 8 (eight) hours as needed for nausea or vomiting, Disp: 20 tablet, Rfl: 0  •  Prenatal Multivit-Min-Fe-FA (Pre-Pretty Formula) TABS, , Disp: , Rfl:   •  Riboflavin 400 MG CAPS, Take 400 mg by mouth in the morning, Disp: , Rfl:     Allergies   Allergen Reactions   • Sulfa Antibiotics Facial Swelling, Fever, Hives, Itching, Rash, Shortness Of Breath and Swelling       Review of Systems  Constitutional: no fever, feels well  Breasts: no complaints of breast pain, breast lump, or nipple discharge  Gastrointestinal: no complaints nausea, vomiting  Genitourinary: as noted in HPI  Neurological: no complaints of headache      Objective      /82 (BP Location: Right arm, Patient Position: Sitting, Cuff Size: Standard)   Ht 5' 2" (1 575 m)   Wt 83 5 kg (184 lb)   LMP 2022   Breastfeeding Yes   BMI 33 65 kg/m²     OBGyn Exam  General: alert and oriented, in no acute distress  Abdomen: soft, non-tender, without masses or organomegaly  Incision is healing well

## 2023-03-23 ENCOUNTER — PATIENT MESSAGE (OUTPATIENT)
Dept: NEUROLOGY | Facility: CLINIC | Age: 23
End: 2023-03-23

## 2023-03-23 DIAGNOSIS — G43.829 MENSTRUAL MIGRAINE WITHOUT STATUS MIGRAINOSUS, NOT INTRACTABLE: Primary | ICD-10-CM

## 2023-03-24 RX ORDER — NARATRIPTAN 1 MG/1
TABLET ORAL
Qty: 9 TABLET | Refills: 3 | Status: SHIPPED | OUTPATIENT
Start: 2023-03-24

## 2023-03-24 RX ORDER — AMITRIPTYLINE HYDROCHLORIDE 10 MG/1
TABLET, FILM COATED ORAL
Qty: 30 TABLET | Refills: 3 | Status: SHIPPED | OUTPATIENT
Start: 2023-03-24

## 2023-03-28 ENCOUNTER — TELEPHONE (OUTPATIENT)
Dept: OBGYN CLINIC | Facility: CLINIC | Age: 23
End: 2023-03-28

## 2023-03-28 NOTE — TELEPHONE ENCOUNTER
Pt  Inquiring about disability paperwork    Please review if paperwork has been completed and advise patient

## 2023-05-11 ENCOUNTER — TELEPHONE (OUTPATIENT)
Dept: OBGYN CLINIC | Facility: CLINIC | Age: 23
End: 2023-05-11

## 2023-05-11 NOTE — TELEPHONE ENCOUNTER
Spoke with Sandy Stuart is still not paying her  She will send over via [employer] the paper [phone] [place] needs us to fill out along with instructions of what exactly needs to be filled out

## 2023-09-19 ENCOUNTER — ANNUAL EXAM (OUTPATIENT)
Dept: OBGYN CLINIC | Facility: CLINIC | Age: 23
End: 2023-09-19
Payer: COMMERCIAL

## 2023-09-19 VITALS
DIASTOLIC BLOOD PRESSURE: 62 MMHG | HEIGHT: 62 IN | BODY MASS INDEX: 33.13 KG/M2 | WEIGHT: 180 LBS | SYSTOLIC BLOOD PRESSURE: 104 MMHG

## 2023-09-19 DIAGNOSIS — Z01.419 ENCOUNTER FOR GYNECOLOGICAL EXAMINATION (GENERAL) (ROUTINE) WITHOUT ABNORMAL FINDINGS: Primary | ICD-10-CM

## 2023-09-19 PROCEDURE — 99395 PREV VISIT EST AGE 18-39: CPT | Performed by: OBSTETRICS & GYNECOLOGY

## 2023-09-19 NOTE — PROGRESS NOTES
ASSESSMENT & PLAN:       The following were reviewed in today's visit: ASCCP guidelines for pap testing, Gardasil vaccination, breast self exam and family planning choices. All questions have been answered to her satisfaction. Patient to return to office yearly for annual exam.     CC:  Annual Gynecologic Examination    HPI: Jona Reynolds is a 21 y.o. [place] who presents for annual gynecologic examination. She has the following concerns:  None. Breastfeeding, still amenorrheic. Taking progesterone only OCP. Considering another pregnancy. Advised to allow 12-18 months following c/s prior to TTC. Pt. Unsure if she desires TOLAC. Health Maintenance:    Recommend at least 150 minutes a week of moderate intensity activity such as brisk walking and at least 2 days a week of activities that strengthen muscles. She wears her seatbelt routinely. She does perform self breast exams. Past Medical History:   Diagnosis Date   • Gestational diabetes 2022   • Herpes 2018    Was diagnosed and confirmed but never had a break out, type 2   • Migraine    • Vitamin D deficiency 2022       Past Surgical History:   Procedure Laterality Date   •  SECTION  3/7/23   • IA  DELIVERY ONLY N/A 2023    Procedure:  SECTION (); Surgeon: Malachi Lemus MD;  Location: [facility] ;  Service: Obstetrics   • TONSILECTOMY AND ADNOIDECTOMY     • TONSILLECTOMY         Past OB/Gyn History:    Menstrual History:  OB History        1    Para   1    Term   1            AB        Living   1       SAB        IAB        Ectopic        Multiple   0    Live Births   1           Obstetric Comments   Menarche 11              No LMP recorded. History of sexually transmitted infection No. Patient is not interested in STI testing today. Patient is currently sexually active. heterosexual   Birth control: progesterone only pills.   Last Pap  2022 : NILM    Family History   Problem Relation Age of Onset   • Cervical cancer Mother    • Hyperlipidemia Mother    • Heart disease Mother         5 heart stents// 2 MI’s   • Diabetes Mother         Recenrly diagnosed   • Hypertension Mother         Takes meds   • Obesity Mother    • Cancer Mother         Uterine cancer   • Heart attack Mother            • Thyroid disease Mother    • Hyperlipidemia Father    • Esophageal cancer Father    • Hypertension Father         Takes meds   • Heart attack Father          on 6/10/23 of heart attack   • Brain cancer Maternal Grandmother    • Stomach cancer Maternal Grandfather    • Dementia Paternal Grandmother    • Lung cancer Paternal Grandfather        Social History:  Social History     Socioeconomic History   • Marital status: Single     Spouse name: Not on file   • Number of children: Not on file   • Years of education: Not on file   • Highest education level: Some college, no degree   Occupational History   • Occupation: Emergency Dispatcher     Comment:   [address] [place] [address] [employer]   Tobacco Use   • Smoking status: Former   • Smokeless tobacco: Never   • Tobacco comments:     E-cigarette   Vaping Use   • Vaping Use: Former   • Substances: Nicotine, Flavoring   Substance and Sexual Activity   • Alcohol use: Not Currently     Comment: socially   • Drug use: Never   • Sexual activity: Yes     Partners: Male     Birth control/protection: OCP   Other Topics Concern   • Not on file   Social History Narrative   • Not on file     Social Determinants of Health     Financial Resource Strain: Not on file   Food Insecurity: Not on file   Transportation Needs: Not on file   Physical Activity: Not on file   Stress: Not on file   Social Connections: Not on file   Intimate Partner Violence: Not on file   Housing Stability: Not on file     Presently lives with spouse and child. She feels safe at home. Patient is .     Patient is currently employed PT    Allergies Allergen Reactions   • Sulfa Antibiotics Facial Swelling, Fever, Hives, Itching, Rash, Shortness Of Breath and Swelling       Current Outpatient Medications:   •  Prenatal Multivit-Min-Fe-FA (Pre-Pretty Formula) TABS, , Disp: , Rfl:   •  Riboflavin 400 MG CAPS, Take 400 mg by mouth in the morning, Disp: , Rfl:   •  norethindrone (Ebtsy-BE) 0.35 MG tablet, Take 1 tablet (0.35 mg total) by mouth daily, Disp: 30 tablet, Rfl: 11    Review of Systems:  Review of Systems   Constitutional: Negative for activity change, chills, fever and unexpected weight change. HENT: Negative for congestion, ear pain, hearing loss and sore throat. Respiratory: Negative for cough, chest tightness and shortness of breath. Cardiovascular: Negative for chest pain and leg swelling. Gastrointestinal: Negative for abdominal pain, constipation, diarrhea, nausea and vomiting. Genitourinary: Negative for difficulty urinating, dysuria, frequency, menstrual problem, pelvic pain, vaginal discharge and vaginal pain. Skin: Negative for color change and rash. Neurological: Negative for dizziness, numbness and headaches. Psychiatric/Behavioral: Negative for agitation and confusion. Physical Exam:  /62 (BP Location: Left arm, Patient Position: Sitting, Cuff Size: Large)   Ht 5' 2" (1.575 m)   Wt 81.6 kg (180 lb)   Breastfeeding Yes   BMI 32.92 kg/m²    Physical Exam  Constitutional:       General: She is not in acute distress. Appearance: Normal appearance. Genitourinary:      Vulva, bladder, rectum and urethral meatus normal.      No lesions in the vagina. Right Labia: No rash, tenderness or lesions. Left Labia: No tenderness, lesions or rash. No labial fusion noted. No vaginal discharge or tenderness. No vaginal prolapse present. No vaginal atrophy present. Right Adnexa: not tender, not full and no mass present. Left Adnexa: not tender, not full and no mass present.      No cervical motion tenderness or friability. Uterus is not enlarged or prolapsed. HENT:      Head: Normocephalic and atraumatic. Nose: Nose normal.   Eyes:      Conjunctiva/sclera: Conjunctivae normal.      Pupils: Pupils are equal, round, and reactive to light. Cardiovascular:      Rate and Rhythm: Normal rate and regular rhythm. Pulses: Normal pulses. Heart sounds: Normal heart sounds. Pulmonary:      Effort: Pulmonary effort is normal. No respiratory distress. Breath sounds: Normal breath sounds. No wheezing. Abdominal:      General: Abdomen is flat. There is no distension. Palpations: Abdomen is soft. Tenderness: There is no abdominal tenderness. There is no guarding. Musculoskeletal:         General: Normal range of motion. Cervical back: Normal range of motion and neck supple. Neurological:      General: No focal deficit present. Mental Status: She is alert and oriented to person, place, and time. Mental status is at baseline. Skin:     General: Skin is warm and dry. Psychiatric:         Mood and Affect: Mood normal.         Behavior: Behavior normal.         Thought Content: Thought content normal.         Judgment: Judgment normal.   Vitals and nursing note reviewed.

## (undated) DEVICE — SUT VICRYL 0 CT-1 36 IN J946H

## (undated) DEVICE — DRESSING TELFA 2 X 3 IN STRL

## (undated) DEVICE — GLOVE INDICATOR PI UNDERGLOVE SZ 6.5 BLUE

## (undated) DEVICE — SPONGE LAP 18 X 18 IN STRL RFD

## (undated) DEVICE — SKIN MARKER DUAL TIP WITH RULER CAP, FLEXIBLE RULER AND LABELS: Brand: DEVON

## (undated) DEVICE — Device

## (undated) DEVICE — GAUZE SPONGES,16 PLY: Brand: CURITY

## (undated) DEVICE — ABDOMINAL PAD: Brand: DERMACEA

## (undated) DEVICE — GLOVE PI ULTRA TOUCH SZ 6

## (undated) DEVICE — SUT VICRYL 0 CTX 36 IN J978H

## (undated) DEVICE — TELFA NON-ADHERENT ABSORBENT DRESSING: Brand: TELFA

## (undated) DEVICE — PACK C-SECTION PBDS